# Patient Record
Sex: FEMALE | Race: WHITE | NOT HISPANIC OR LATINO | Employment: OTHER | URBAN - METROPOLITAN AREA
[De-identification: names, ages, dates, MRNs, and addresses within clinical notes are randomized per-mention and may not be internally consistent; named-entity substitution may affect disease eponyms.]

---

## 2017-07-21 ENCOUNTER — APPOINTMENT (OUTPATIENT)
Dept: WOMENS IMAGING | Facility: HOSPITAL | Age: 57
End: 2017-07-21

## 2017-07-21 PROCEDURE — 77067 SCR MAMMO BI INCL CAD: CPT | Performed by: RADIOLOGY

## 2017-07-21 PROCEDURE — 77063 BREAST TOMOSYNTHESIS BI: CPT | Performed by: RADIOLOGY

## 2017-07-21 PROCEDURE — G0202 SCR MAMMO BI INCL CAD: HCPCS | Performed by: RADIOLOGY

## 2017-10-12 ENCOUNTER — OFFICE VISIT (OUTPATIENT)
Dept: FAMILY MEDICINE CLINIC | Facility: CLINIC | Age: 57
End: 2017-10-12

## 2017-10-12 VITALS
WEIGHT: 150 LBS | DIASTOLIC BLOOD PRESSURE: 82 MMHG | RESPIRATION RATE: 16 BRPM | BODY MASS INDEX: 24.99 KG/M2 | HEIGHT: 65 IN | TEMPERATURE: 97.9 F | HEART RATE: 72 BPM | SYSTOLIC BLOOD PRESSURE: 126 MMHG

## 2017-10-12 DIAGNOSIS — Z00.00 ANNUAL PHYSICAL EXAM: ICD-10-CM

## 2017-10-12 DIAGNOSIS — K91.5 POST-CHOLECYSTECTOMY SYNDROME: Primary | ICD-10-CM

## 2017-10-12 DIAGNOSIS — R10.12 LUQ PAIN: ICD-10-CM

## 2017-10-12 PROCEDURE — 99203 OFFICE O/P NEW LOW 30 MIN: CPT | Performed by: FAMILY MEDICINE

## 2017-10-12 RX ORDER — COLESEVELAM 180 1/1
1875 TABLET ORAL 2 TIMES DAILY WITH MEALS
Qty: 180 TABLET | Refills: 11 | Status: SHIPPED | OUTPATIENT
Start: 2017-10-12 | End: 2018-12-05

## 2017-10-12 NOTE — PROGRESS NOTES
"Subjective   Cailin Garcia is a 57 y.o. female.     CC: Establishment of Care for Rib Cage Pain    History of Present Illness     Pt presents today to establish care for rib cage pain x 1 year. She actually points to the LUQ and reports really hurts when lays on that side, can be severe. Has had renal stones on that side and had treatment for that (Dr. Black). Always a \"little pressure\" in the area yet really hurts when laying on the left side and can even make her dyspnic when laying that way. Since she has had her GB out 2002, she has had diarrhea every day (c-scope UTD and WNL). No trauma to sit ever.      The following portions of the patient's history were reviewed and updated as appropriate: allergies, current medications, past family history, past medical history, past social history, past surgical history and problem list.    Review of Systems   Constitutional: Negative for activity change, chills, fatigue and fever.   Respiratory: Negative for cough and chest tightness.         Chest wall pain   Cardiovascular: Negative for chest pain and palpitations.   Gastrointestinal: Negative for abdominal pain and nausea.   Endocrine: Negative for cold intolerance.   Psychiatric/Behavioral: Negative for behavioral problems and dysphoric mood.     /82  Pulse 72  Temp 97.9 °F (36.6 °C) (Oral)   Resp 16  Ht 65\" (165.1 cm)  Wt 150 lb (68 kg)  BMI 24.96 kg/m2    Objective   Physical Exam   Constitutional: She appears well-developed and well-nourished.   Neck: Neck supple. No thyromegaly present.   Cardiovascular: Normal rate and regular rhythm.    No murmur heard.  Pulmonary/Chest: Effort normal and breath sounds normal.   Abdominal: Bowel sounds are normal. There is tenderness (LUQ w/o R/G/Mass).   Musculoskeletal: She exhibits tenderness (mild, left lateral lower ribs).   Psychiatric: She has a normal mood and affect. Her behavior is normal.   Nursing note and vitals reviewed.  Sury Kellogg np student, " present for exam.    Assessment/Plan   Cailin was seen today for left rib cage pain.    Diagnoses and all orders for this visit:    Post-cholecystectomy syndrome  -     colesevelam (WELCHOL) 625 MG tablet; Take 3 tablets by mouth 2 (Two) Times a Day With Meals.    LUQ pain  -     CT abdomen w wo contrast; Future    Annual physical exam  Comments:  for labs only  Orders:  -     Comprehensive metabolic panel  -     Lipid panel  -     CBC and Differential  -     TSH

## 2017-10-14 LAB
ALBUMIN SERPL-MCNC: 4.5 G/DL (ref 3.5–5.2)
ALBUMIN/GLOB SERPL: 1.7 G/DL
ALP SERPL-CCNC: 101 U/L (ref 39–117)
ALT SERPL-CCNC: 24 U/L (ref 1–33)
AST SERPL-CCNC: 17 U/L (ref 1–32)
BASOPHILS # BLD AUTO: 0.02 10*3/MM3 (ref 0–0.2)
BASOPHILS NFR BLD AUTO: 0.4 % (ref 0–1.5)
BILIRUB SERPL-MCNC: 0.3 MG/DL (ref 0.1–1.2)
BUN SERPL-MCNC: 8 MG/DL (ref 6–20)
BUN/CREAT SERPL: 8.8 (ref 7–25)
CALCIUM SERPL-MCNC: 9.1 MG/DL (ref 8.6–10.5)
CHLORIDE SERPL-SCNC: 103 MMOL/L (ref 98–107)
CHOLEST SERPL-MCNC: 207 MG/DL (ref 0–200)
CO2 SERPL-SCNC: 24.8 MMOL/L (ref 22–29)
CREAT SERPL-MCNC: 0.91 MG/DL (ref 0.57–1)
DIFFERENTIAL COMMENT: NORMAL
EOSINOPHIL # BLD AUTO: 0.27 10*3/MM3 (ref 0–0.7)
EOSINOPHIL NFR BLD AUTO: 4.8 % (ref 0.3–6.2)
ERYTHROCYTE [DISTWIDTH] IN BLOOD BY AUTOMATED COUNT: 13.2 % (ref 11.7–13)
GLOBULIN SER CALC-MCNC: 2.6 GM/DL
GLUCOSE SERPL-MCNC: 98 MG/DL (ref 65–99)
HCT VFR BLD AUTO: 42 % (ref 35.6–45.5)
HDLC SERPL-MCNC: 59 MG/DL (ref 40–60)
HGB BLD-MCNC: 13.5 G/DL (ref 11.9–15.5)
IMM GRANULOCYTES # BLD: 0 10*3/MM3 (ref 0–0.03)
IMM GRANULOCYTES NFR BLD: 0 % (ref 0–0.5)
LDLC SERPL CALC-MCNC: 122 MG/DL (ref 0–100)
LYMPHOCYTES # BLD AUTO: 2.47 10*3/MM3 (ref 0.9–4.8)
LYMPHOCYTES NFR BLD AUTO: 44 % (ref 19.6–45.3)
MCH RBC QN AUTO: 31.2 PG (ref 26.9–32)
MCHC RBC AUTO-ENTMCNC: 32.1 G/DL (ref 32.4–36.3)
MCV RBC AUTO: 97 FL (ref 80.5–98.2)
MONOCYTES # BLD AUTO: 0.34 10*3/MM3 (ref 0.2–1.2)
MONOCYTES NFR BLD AUTO: 6.1 % (ref 5–12)
NEUTROPHILS # BLD AUTO: 2.51 10*3/MM3 (ref 1.9–8.1)
NEUTROPHILS NFR BLD AUTO: 44.7 % (ref 42.7–76)
NRBC BLD AUTO-RTO: 0 /100 WBC (ref 0–0)
PLATELET # BLD AUTO: 310 10*3/MM3 (ref 140–500)
PLATELET BLD QL SMEAR: NORMAL
POTASSIUM SERPL-SCNC: 5.1 MMOL/L (ref 3.5–5.2)
PROT SERPL-MCNC: 7.1 G/DL (ref 6–8.5)
RBC # BLD AUTO: 4.33 10*6/MM3 (ref 3.9–5.2)
RBC MORPH BLD: NORMAL
SODIUM SERPL-SCNC: 142 MMOL/L (ref 136–145)
TRIGL SERPL-MCNC: 128 MG/DL (ref 0–150)
TSH SERPL DL<=0.005 MIU/L-ACNC: 4.97 MIU/ML (ref 0.27–4.2)
VLDLC SERPL CALC-MCNC: 25.6 MG/DL (ref 5–40)
WBC # BLD AUTO: 5.61 10*3/MM3 (ref 4.5–10.7)

## 2017-10-16 DIAGNOSIS — E03.9 HYPOTHYROIDISM, UNSPECIFIED TYPE: Primary | ICD-10-CM

## 2017-10-16 RX ORDER — LEVOTHYROXINE SODIUM 0.03 MG/1
25 TABLET ORAL DAILY
Qty: 30 TABLET | Refills: 5 | Status: SHIPPED | OUTPATIENT
Start: 2017-10-16 | End: 2018-06-28 | Stop reason: SDUPTHER

## 2017-10-23 ENCOUNTER — TELEPHONE (OUTPATIENT)
Dept: FAMILY MEDICINE CLINIC | Facility: CLINIC | Age: 57
End: 2017-10-23

## 2017-10-23 ENCOUNTER — HOSPITAL ENCOUNTER (OUTPATIENT)
Dept: CT IMAGING | Facility: HOSPITAL | Age: 57
Discharge: HOME | End: 2017-10-23
Admitting: FAMILY MEDICINE

## 2017-10-23 DIAGNOSIS — R10.12 LEFT UPPER QUADRANT PAIN: Primary | ICD-10-CM

## 2017-10-23 DIAGNOSIS — R10.12 LEFT UPPER QUADRANT PAIN: ICD-10-CM

## 2017-10-23 LAB — CREAT BLDA-MCNC: 0.8 MG/DL (ref 0.6–1.3)

## 2017-10-23 PROCEDURE — 74160 CT ABDOMEN W/CONTRAST: CPT

## 2017-10-23 PROCEDURE — 0 IOPAMIDOL 61 % SOLUTION: Performed by: FAMILY MEDICINE

## 2017-10-23 PROCEDURE — 82565 ASSAY OF CREATININE: CPT

## 2017-10-23 PROCEDURE — 0 DIATRIZOATE MEGLUMINE & SODIUM PER 1 ML: Performed by: FAMILY MEDICINE

## 2017-10-23 RX ADMIN — IOPAMIDOL 85 ML: 612 INJECTION, SOLUTION INTRAVENOUS at 10:38

## 2017-10-23 RX ADMIN — DIATRIZOATE MEGLUMINE AND DIATRIZOATE SODIUM 30 ML: 660; 100 LIQUID ORAL; RECTAL at 10:38

## 2017-12-06 LAB
T4 FREE SERPL-MCNC: 1.11 NG/DL (ref 0.93–1.7)
TSH SERPL DL<=0.005 MIU/L-ACNC: 2.84 MIU/ML (ref 0.27–4.2)

## 2018-06-28 ENCOUNTER — OFFICE VISIT (OUTPATIENT)
Dept: FAMILY MEDICINE CLINIC | Facility: CLINIC | Age: 58
End: 2018-06-28

## 2018-06-28 VITALS
TEMPERATURE: 97.8 F | DIASTOLIC BLOOD PRESSURE: 73 MMHG | HEART RATE: 68 BPM | RESPIRATION RATE: 16 BRPM | WEIGHT: 149 LBS | SYSTOLIC BLOOD PRESSURE: 121 MMHG | HEIGHT: 65 IN | BODY MASS INDEX: 24.83 KG/M2

## 2018-06-28 DIAGNOSIS — J01.00 ACUTE NON-RECURRENT MAXILLARY SINUSITIS: Primary | ICD-10-CM

## 2018-06-28 DIAGNOSIS — E03.9 HYPOTHYROIDISM, UNSPECIFIED TYPE: ICD-10-CM

## 2018-06-28 PROCEDURE — 99213 OFFICE O/P EST LOW 20 MIN: CPT | Performed by: FAMILY MEDICINE

## 2018-06-28 RX ORDER — LEVOTHYROXINE SODIUM 0.03 MG/1
25 TABLET ORAL DAILY
Qty: 30 TABLET | Refills: 5 | Status: SHIPPED | OUTPATIENT
Start: 2018-06-28 | End: 2018-12-05 | Stop reason: SDUPTHER

## 2018-06-28 RX ORDER — AZITHROMYCIN 250 MG/1
TABLET, FILM COATED ORAL
Qty: 6 TABLET | Refills: 0 | Status: SHIPPED | OUTPATIENT
Start: 2018-06-28 | End: 2018-12-05

## 2018-06-28 NOTE — PROGRESS NOTES
"Subjective   Cailin Garcia is a 57 y.o. female.     History of Present Illness     Chief Complaint:   Chief Complaint   Patient presents with   • Nasal Congestion     x 4-5 days    • Sinusitis   • Hypothyroidism     out of medications since march        Cailin Garcia 57 y.o. female who presents today for Medical Management of the below listed issues and medication refills.  she has a problem list of   Patient Active Problem List   Diagnosis   • Post-cholecystectomy syndrome   .  Since the last visit, she has overall felt well until 5 days ago when she developed LAD (neck), ear pressure, mild cough and \"chest tightness\". No f/c. Did have some colored sputum. she has been compliant with   Current Outpatient Prescriptions:   •  azithromycin (ZITHROMAX Z-KOTA) 250 MG tablet, Take 2 tablets the first day, then 1 tablet daily for 4 days., Disp: 6 tablet, Rfl: 0  •  colesevelam (WELCHOL) 625 MG tablet, Take 3 tablets by mouth 2 (Two) Times a Day With Meals., Disp: 180 tablet, Rfl: 11  •  levothyroxine (SYNTHROID) 25 MCG tablet, Take 1 tablet by mouth Daily., Disp: 30 tablet, Rfl: 5.  she denies medication side effects.    All of the chronic condition(s) listed above are stable w/o issues.    /73   Pulse 68   Temp 97.8 °F (36.6 °C) (Oral)   Resp 16   Ht 165.1 cm (65\")   Wt 67.6 kg (149 lb)   BMI 24.79 kg/m²     Results for orders placed or performed during the hospital encounter of 10/23/17   POC Creatinine   Result Value Ref Range    Creatinine 0.80 0.60 - 1.30 mg/dL           The following portions of the patient's history were reviewed and updated as appropriate: allergies, current medications, past family history, past medical history, past social history, past surgical history and problem list.    Review of Systems   Constitutional: Negative for activity change, chills, fatigue and fever.   HENT: Positive for congestion and sinus pressure.    Respiratory: Positive for cough. Negative for chest tightness.  "   Cardiovascular: Negative for chest pain and palpitations.   Gastrointestinal: Negative for abdominal pain and nausea.   Endocrine: Negative for cold intolerance.   Psychiatric/Behavioral: Negative for behavioral problems and dysphoric mood.       Objective   Physical Exam   Constitutional: She appears well-developed and well-nourished.   HENT:   Nose: Right sinus exhibits maxillary sinus tenderness. Left sinus exhibits maxillary sinus tenderness.   Neck: Neck supple. No thyromegaly present.   Cardiovascular: Normal rate and regular rhythm.    No murmur heard.  Pulmonary/Chest: Effort normal and breath sounds normal.   Lymphadenopathy:     She has no cervical adenopathy.   Psychiatric: She has a normal mood and affect. Her behavior is normal.   Nursing note and vitals reviewed.      Assessment/Plan   Cailin was seen today for nasal congestion, sinusitis and hypothyroidism.    Diagnoses and all orders for this visit:    Acute non-recurrent maxillary sinusitis  -     azithromycin (ZITHROMAX Z-KOTA) 250 MG tablet; Take 2 tablets the first day, then 1 tablet daily for 4 days.    Hypothyroidism, unspecified type  -     levothyroxine (SYNTHROID) 25 MCG tablet; Take 1 tablet by mouth Daily.

## 2018-08-09 ENCOUNTER — APPOINTMENT (OUTPATIENT)
Dept: WOMENS IMAGING | Facility: HOSPITAL | Age: 58
End: 2018-08-09

## 2018-08-09 PROCEDURE — 77063 BREAST TOMOSYNTHESIS BI: CPT | Performed by: RADIOLOGY

## 2018-08-09 PROCEDURE — 77067 SCR MAMMO BI INCL CAD: CPT | Performed by: RADIOLOGY

## 2018-11-28 ENCOUNTER — TELEPHONE (OUTPATIENT)
Dept: FAMILY MEDICINE CLINIC | Facility: CLINIC | Age: 58
End: 2018-11-28

## 2018-11-28 DIAGNOSIS — Z00.00 GENERAL MEDICAL EXAM: ICD-10-CM

## 2018-11-28 DIAGNOSIS — E03.9 ACQUIRED HYPOTHYROIDISM: Primary | ICD-10-CM

## 2018-12-02 LAB
ALBUMIN SERPL-MCNC: 4.6 G/DL (ref 3.5–5.5)
ALBUMIN/GLOB SERPL: 2.1 {RATIO} (ref 1.2–2.2)
ALP SERPL-CCNC: 112 IU/L (ref 39–117)
ALT SERPL-CCNC: 32 IU/L (ref 0–32)
AST SERPL-CCNC: 21 IU/L (ref 0–40)
BASOPHILS # BLD AUTO: 0 X10E3/UL (ref 0–0.2)
BASOPHILS NFR BLD AUTO: 1 %
BILIRUB SERPL-MCNC: 0.3 MG/DL (ref 0–1.2)
BUN SERPL-MCNC: 9 MG/DL (ref 6–24)
BUN/CREAT SERPL: 11 (ref 9–23)
CALCIUM SERPL-MCNC: 9.5 MG/DL (ref 8.7–10.2)
CHLORIDE SERPL-SCNC: 104 MMOL/L (ref 96–106)
CHOLEST SERPL-MCNC: 223 MG/DL (ref 100–199)
CO2 SERPL-SCNC: 23 MMOL/L (ref 20–29)
CREAT SERPL-MCNC: 0.79 MG/DL (ref 0.57–1)
EOSINOPHIL # BLD AUTO: 0.3 X10E3/UL (ref 0–0.4)
EOSINOPHIL NFR BLD AUTO: 5 %
ERYTHROCYTE [DISTWIDTH] IN BLOOD BY AUTOMATED COUNT: 13.9 % (ref 12.3–15.4)
GLOBULIN SER CALC-MCNC: 2.2 G/DL (ref 1.5–4.5)
GLUCOSE SERPL-MCNC: 101 MG/DL (ref 65–99)
HCT VFR BLD AUTO: 43.3 % (ref 34–46.6)
HDLC SERPL-MCNC: 56 MG/DL
HGB BLD-MCNC: 14.3 G/DL (ref 11.1–15.9)
IMM GRANULOCYTES # BLD: 0 X10E3/UL (ref 0–0.1)
IMM GRANULOCYTES NFR BLD: 0 %
LDLC SERPL CALC-MCNC: 130 MG/DL (ref 0–99)
LDLC/HDLC SERPL: 2.3 RATIO (ref 0–3.2)
LYMPHOCYTES # BLD AUTO: 2.7 X10E3/UL (ref 0.7–3.1)
LYMPHOCYTES NFR BLD AUTO: 45 %
MCH RBC QN AUTO: 30.2 PG (ref 26.6–33)
MCHC RBC AUTO-ENTMCNC: 33 G/DL (ref 31.5–35.7)
MCV RBC AUTO: 92 FL (ref 79–97)
MONOCYTES # BLD AUTO: 0.4 X10E3/UL (ref 0.1–0.9)
MONOCYTES NFR BLD AUTO: 7 %
NEUTROPHILS # BLD AUTO: 2.5 X10E3/UL (ref 1.4–7)
NEUTROPHILS NFR BLD AUTO: 42 %
PLATELET # BLD AUTO: 287 X10E3/UL (ref 150–379)
POTASSIUM SERPL-SCNC: 4 MMOL/L (ref 3.5–5.2)
PROT SERPL-MCNC: 6.8 G/DL (ref 6–8.5)
RBC # BLD AUTO: 4.73 X10E6/UL (ref 3.77–5.28)
SODIUM SERPL-SCNC: 143 MMOL/L (ref 134–144)
T4 FREE SERPL-MCNC: 1.24 NG/DL (ref 0.82–1.77)
TRIGL SERPL-MCNC: 185 MG/DL (ref 0–149)
TSH SERPL DL<=0.005 MIU/L-ACNC: 4.53 UIU/ML (ref 0.45–4.5)
VLDLC SERPL CALC-MCNC: 37 MG/DL (ref 5–40)
WBC # BLD AUTO: 6 X10E3/UL (ref 3.4–10.8)

## 2018-12-05 ENCOUNTER — OFFICE VISIT (OUTPATIENT)
Dept: FAMILY MEDICINE CLINIC | Facility: CLINIC | Age: 58
End: 2018-12-05

## 2018-12-05 VITALS
TEMPERATURE: 98.2 F | RESPIRATION RATE: 16 BRPM | WEIGHT: 150 LBS | HEART RATE: 72 BPM | SYSTOLIC BLOOD PRESSURE: 119 MMHG | HEIGHT: 65 IN | BODY MASS INDEX: 24.99 KG/M2 | DIASTOLIC BLOOD PRESSURE: 74 MMHG

## 2018-12-05 DIAGNOSIS — E03.9 HYPOTHYROIDISM, UNSPECIFIED TYPE: ICD-10-CM

## 2018-12-05 PROCEDURE — 99213 OFFICE O/P EST LOW 20 MIN: CPT | Performed by: FAMILY MEDICINE

## 2018-12-05 RX ORDER — LEVOTHYROXINE SODIUM 0.03 MG/1
25 TABLET ORAL DAILY
Qty: 30 TABLET | Refills: 11 | Status: SHIPPED | OUTPATIENT
Start: 2018-12-05 | End: 2019-04-24 | Stop reason: SDUPTHER

## 2018-12-05 NOTE — PROGRESS NOTES
"Subjective   Cailin Garcia is a 58 y.o. female.     History of Present Illness     Chief Complaint:   Chief Complaint   Patient presents with   • Hypothyroidism     MED REFILL LAB RESULTS        Cailin Garcia 58 y.o. female who presents today for Medical Management of the below listed issues and medication refills.  she has a problem list of   Patient Active Problem List   Diagnosis   • Post-cholecystectomy syndrome   • Hypothyroidism   .  Since the last visit, she has overall felt well.  she has been compliant with   Current Outpatient Medications:   •  levothyroxine (SYNTHROID) 25 MCG tablet, Take 1 tablet by mouth Daily., Disp: 30 tablet, Rfl: 11.  she denies medication side effects.    All of the chronic condition(s) listed above are stable w/o issues.    /74   Pulse 72   Temp 98.2 °F (36.8 °C) (Oral)   Resp 16   Ht 165.1 cm (65\")   Wt 68 kg (150 lb)   BMI 24.96 kg/m²     Results for orders placed or performed in visit on 11/28/18   Comprehensive metabolic panel   Result Value Ref Range    Glucose 101 (H) 65 - 99 mg/dL    BUN 9 6 - 24 mg/dL    Creatinine 0.79 0.57 - 1.00 mg/dL    eGFR Non African Am 82 >59 mL/min/1.73    eGFR African Am 95 >59 mL/min/1.73    BUN/Creatinine Ratio 11 9 - 23    Sodium 143 134 - 144 mmol/L    Potassium 4.0 3.5 - 5.2 mmol/L    Chloride 104 96 - 106 mmol/L    Total CO2 23 20 - 29 mmol/L    Calcium 9.5 8.7 - 10.2 mg/dL    Total Protein 6.8 6.0 - 8.5 g/dL    Albumin 4.6 3.5 - 5.5 g/dL    Globulin 2.2 1.5 - 4.5 g/dL    A/G Ratio 2.1 1.2 - 2.2    Total Bilirubin 0.3 0.0 - 1.2 mg/dL    Alkaline Phosphatase 112 39 - 117 IU/L    AST (SGOT) 21 0 - 40 IU/L    ALT (SGPT) 32 0 - 32 IU/L   Lipid Panel With LDL/HDL Ratio   Result Value Ref Range    Total Cholesterol 223 (H) 100 - 199 mg/dL    Triglycerides 185 (H) 0 - 149 mg/dL    HDL Cholesterol 56 >39 mg/dL    VLDL Cholesterol 37 5 - 40 mg/dL    LDL Cholesterol  130 (H) 0 - 99 mg/dL    LDL/HDL Ratio 2.3 0.0 - 3.2 ratio   TSH "   Result Value Ref Range    TSH 4.530 (H) 0.450 - 4.500 uIU/mL   T4, Free   Result Value Ref Range    Free T4 1.24 0.82 - 1.77 ng/dL   CBC and Differential   Result Value Ref Range    WBC 6.0 3.4 - 10.8 x10E3/uL    RBC 4.73 3.77 - 5.28 x10E6/uL    Hemoglobin 14.3 11.1 - 15.9 g/dL    Hematocrit 43.3 34.0 - 46.6 %    MCV 92 79 - 97 fL    MCH 30.2 26.6 - 33.0 pg    MCHC 33.0 31.5 - 35.7 g/dL    RDW 13.9 12.3 - 15.4 %    Platelets 287 150 - 379 x10E3/uL    Neutrophil Rel % 42 Not Estab. %    Lymphocyte Rel % 45 Not Estab. %    Monocyte Rel % 7 Not Estab. %    Eosinophil Rel % 5 Not Estab. %    Basophil Rel % 1 Not Estab. %    Neutrophils Absolute 2.5 1.4 - 7.0 x10E3/uL    Lymphocytes Absolute 2.7 0.7 - 3.1 x10E3/uL    Monocytes Absolute 0.4 0.1 - 0.9 x10E3/uL    Eosinophils Absolute 0.3 0.0 - 0.4 x10E3/uL    Basophils Absolute 0.0 0.0 - 0.2 x10E3/uL    Immature Granulocyte Rel % 0 Not Estab. %    Immature Grans Absolute 0.0 0.0 - 0.1 x10E3/uL           The following portions of the patient's history were reviewed and updated as appropriate: allergies, current medications, past family history, past medical history, past social history, past surgical history and problem list.    Review of Systems   Constitutional: Negative for activity change, chills, fatigue and fever.   Respiratory: Negative for cough and chest tightness.    Cardiovascular: Negative for chest pain and palpitations.   Gastrointestinal: Negative for abdominal pain and nausea.   Endocrine: Negative for cold intolerance.   Psychiatric/Behavioral: Negative for behavioral problems and dysphoric mood.       Objective   Physical Exam   Constitutional: She appears well-developed and well-nourished.   Neck: Neck supple. No thyromegaly present.   Cardiovascular: Normal rate and regular rhythm.   No murmur heard.  Pulmonary/Chest: Effort normal and breath sounds normal.   Abdominal: Bowel sounds are normal. There is no tenderness.   Neurological: She is alert.    Psychiatric: She has a normal mood and affect. Her behavior is normal.   Nursing note and vitals reviewed.  Labs reviewed with pt today during visit. All questions answered.      Assessment/Plan   Cailin was seen today for hypothyroidism.    Diagnoses and all orders for this visit:    Hypothyroidism, unspecified type  -     levothyroxine (SYNTHROID) 25 MCG tablet; Take 1 tablet by mouth Daily.  -     TSH; Future  -     T4, Free; Future

## 2019-03-05 ENCOUNTER — RESULTS ENCOUNTER (OUTPATIENT)
Dept: FAMILY MEDICINE CLINIC | Facility: CLINIC | Age: 59
End: 2019-03-05

## 2019-03-05 DIAGNOSIS — E03.9 HYPOTHYROIDISM, UNSPECIFIED TYPE: ICD-10-CM

## 2019-04-20 LAB
T4 FREE SERPL-MCNC: 1.15 NG/DL (ref 0.93–1.7)
TSH SERPL DL<=0.005 MIU/L-ACNC: 2.68 MIU/ML (ref 0.27–4.2)

## 2019-04-24 ENCOUNTER — OFFICE VISIT (OUTPATIENT)
Dept: FAMILY MEDICINE CLINIC | Facility: CLINIC | Age: 59
End: 2019-04-24

## 2019-04-24 VITALS
SYSTOLIC BLOOD PRESSURE: 109 MMHG | WEIGHT: 149 LBS | RESPIRATION RATE: 14 BRPM | BODY MASS INDEX: 24.83 KG/M2 | HEART RATE: 62 BPM | DIASTOLIC BLOOD PRESSURE: 62 MMHG | HEIGHT: 65 IN | TEMPERATURE: 98.1 F

## 2019-04-24 DIAGNOSIS — E03.9 ACQUIRED HYPOTHYROIDISM: ICD-10-CM

## 2019-04-24 PROCEDURE — 99213 OFFICE O/P EST LOW 20 MIN: CPT | Performed by: FAMILY MEDICINE

## 2019-04-24 RX ORDER — LEVOTHYROXINE SODIUM 0.03 MG/1
25 TABLET ORAL DAILY
Qty: 90 TABLET | Refills: 3 | Status: SHIPPED | OUTPATIENT
Start: 2019-04-24 | End: 2020-01-31 | Stop reason: SDUPTHER

## 2019-09-03 ENCOUNTER — APPOINTMENT (OUTPATIENT)
Dept: WOMENS IMAGING | Facility: HOSPITAL | Age: 59
End: 2019-09-03

## 2019-09-03 PROCEDURE — 77080 DXA BONE DENSITY AXIAL: CPT | Performed by: RADIOLOGY

## 2019-09-03 PROCEDURE — 77067 SCR MAMMO BI INCL CAD: CPT | Performed by: RADIOLOGY

## 2019-09-03 PROCEDURE — 77063 BREAST TOMOSYNTHESIS BI: CPT | Performed by: RADIOLOGY

## 2020-01-23 ENCOUNTER — TELEPHONE (OUTPATIENT)
Dept: FAMILY MEDICINE CLINIC | Facility: CLINIC | Age: 60
End: 2020-01-23

## 2020-01-23 DIAGNOSIS — Z00.00 GENERAL MEDICAL EXAM: ICD-10-CM

## 2020-01-23 DIAGNOSIS — E03.9 ACQUIRED HYPOTHYROIDISM: Primary | ICD-10-CM

## 2020-01-23 NOTE — TELEPHONE ENCOUNTER
Pt want labs done for her Thyroid due to hair loss. She will call me next week so that we can recheck her Thyroid while she is in [place]. She states she is having issues with her hair thinning and falling out

## 2020-01-31 DIAGNOSIS — E03.9 ACQUIRED HYPOTHYROIDISM: ICD-10-CM

## 2020-01-31 RX ORDER — LEVOTHYROXINE SODIUM 0.03 MG/1
25 TABLET ORAL DAILY
Qty: 90 TABLET | Refills: 0 | Status: SHIPPED | OUTPATIENT
Start: 2020-01-31 | End: 2020-04-28 | Stop reason: SDUPTHER

## 2020-04-28 ENCOUNTER — TELEMEDICINE (OUTPATIENT)
Dept: FAMILY MEDICINE CLINIC | Facility: CLINIC | Age: 60
End: 2020-04-28

## 2020-04-28 DIAGNOSIS — E03.9 ACQUIRED HYPOTHYROIDISM: ICD-10-CM

## 2020-04-28 PROCEDURE — 99213 OFFICE O/P EST LOW 20 MIN: CPT | Performed by: FAMILY MEDICINE

## 2020-04-28 RX ORDER — LEVOTHYROXINE SODIUM 0.03 MG/1
25 TABLET ORAL DAILY
Qty: 90 TABLET | Refills: 3 | Status: SHIPPED | OUTPATIENT
Start: 2020-04-28 | End: 2021-04-08 | Stop reason: SDUPTHER

## 2020-04-28 NOTE — PROGRESS NOTES
Subjective   Cailin Garcia is a 59 y.o. female.     CC: Video Visit for Medical Management    History of Present Illness     Chief Complaint:   Chief Complaint   Patient presents with   • Hypothyroidism       Cailin Garcia 59 y.o. female who presents today for Medical Management of the below listed issues and medication refills.  she has a problem list of   Patient Active Problem List   Diagnosis   • Post-cholecystectomy syndrome   • Hypothyroidism   .  Since the last visit, she has overall felt well.  she has been compliant with   Current Outpatient Medications:   •  levothyroxine (Synthroid) 25 MCG tablet, Take 1 tablet by mouth Daily., Disp: 90 tablet, Rfl: 3.  she denies medication side effects.    All of the other chronic condition(s) listed above are stable w/o issues.    There were no vitals taken for this visit.    Results for orders placed or performed in visit on 03/05/19   TSH   Result Value Ref Range    TSH 2.680 0.270 - 4.200 mIU/mL   T4, Free   Result Value Ref Range    Free T4 1.15 0.93 - 1.70 ng/dL           The following portions of the patient's history were reviewed and updated as appropriate: allergies, current medications, past family history, past medical history, past social history, past surgical history and problem list.    Review of Systems   Constitutional: Negative for activity change, chills and fever.   Respiratory: Negative for cough.    Cardiovascular: Negative for chest pain.   Psychiatric/Behavioral: Negative for dysphoric mood.       Objective   Physical Exam   Constitutional: She appears well-developed and well-nourished. No distress.   Psychiatric: She has a normal mood and affect. Her behavior is normal. Thought content normal.   Labs reviewed with pt today during visit. All questions answered.      Assessment/Plan   Cailin was seen today for hypothyroidism.    Diagnoses and all orders for this visit:    Acquired hypothyroidism  -     levothyroxine (Synthroid) 25 MCG tablet;  Take 1 tablet by mouth Daily.    Spent  15   minutes with chart and interview and consent for this encounter given by the patient.  You have chosen to receive care through a telehealth visit.  Do you consent to use a video/audio connection for your medical care today? Yes

## 2020-09-23 ENCOUNTER — APPOINTMENT (OUTPATIENT)
Dept: WOMENS IMAGING | Facility: HOSPITAL | Age: 60
End: 2020-09-23

## 2020-09-23 PROCEDURE — 77067 SCR MAMMO BI INCL CAD: CPT | Performed by: RADIOLOGY

## 2020-09-23 PROCEDURE — 77063 BREAST TOMOSYNTHESIS BI: CPT | Performed by: RADIOLOGY

## 2021-03-16 ENCOUNTER — BULK ORDERING (OUTPATIENT)
Dept: CASE MANAGEMENT | Facility: OTHER | Age: 61
End: 2021-03-16

## 2021-03-16 DIAGNOSIS — Z23 IMMUNIZATION DUE: ICD-10-CM

## 2021-04-08 ENCOUNTER — TELEPHONE (OUTPATIENT)
Dept: FAMILY MEDICINE CLINIC | Facility: CLINIC | Age: 61
End: 2021-04-08

## 2021-04-08 DIAGNOSIS — Z00.00 GENERAL MEDICAL EXAM: Primary | ICD-10-CM

## 2021-04-08 DIAGNOSIS — E03.9 ACQUIRED HYPOTHYROIDISM: ICD-10-CM

## 2021-04-08 RX ORDER — LEVOTHYROXINE SODIUM 0.03 MG/1
25 TABLET ORAL DAILY
Qty: 90 TABLET | Refills: 0 | Status: SHIPPED | OUTPATIENT
Start: 2021-04-08 | End: 2021-04-15 | Stop reason: SDUPTHER

## 2021-04-08 NOTE — TELEPHONE ENCOUNTER
PATIENT STATES THAT HER THYROID MEDICATION IS GOING TO  SOON AND IS REQUESTING AN ORDER FOR LABS PRIOR TO HER VISIT    SHE ALSO STATES THAT SHE SPENDS HER FRANCO IN [place] AND STATES THAT APPROXIMATELY 3-4 WEEKS AFTER BEING THERE SHE EXPERIENCES HAIR LOSS. SHE STATES THAT THIS HAPPENED THE PREVIOUS WINTER AS WELL. SHE STATES THAT SHE IS EXPOSED TO POOL/LAWN/PESTICIDE CHEMICALS AND WOULD LIKE TO BE ADVISED ON IF THERE MAY BE A CONNECTION BETWEEN THE HAIR LOSS AND ALL THE CHEMICALS THAT SHE IS EXPOSED TO WHILE IN [place] OR IF THERE IS A LAB THAT CAN IDENTIFY THE CAUSE    PLEASE ADVISE    PATIENT CAN BE REACHED AT  [phone]

## 2021-04-12 LAB
ALBUMIN SERPL-MCNC: 4.6 G/DL (ref 3.5–5.2)
ALBUMIN/GLOB SERPL: 2.3 G/DL
ALP SERPL-CCNC: 101 U/L (ref 39–117)
ALT SERPL-CCNC: 72 U/L (ref 1–33)
AST SERPL-CCNC: 58 U/L (ref 1–32)
BASOPHILS # BLD AUTO: 0.04 10*3/MM3 (ref 0–0.2)
BASOPHILS NFR BLD AUTO: 1 % (ref 0–1.5)
BILIRUB SERPL-MCNC: 0.4 MG/DL (ref 0–1.2)
BUN SERPL-MCNC: 11 MG/DL (ref 8–23)
BUN/CREAT SERPL: 14.3 (ref 7–25)
CALCIUM SERPL-MCNC: 9.6 MG/DL (ref 8.6–10.5)
CHLORIDE SERPL-SCNC: 105 MMOL/L (ref 98–107)
CHOLEST SERPL-MCNC: 166 MG/DL (ref 0–200)
CO2 SERPL-SCNC: 26.9 MMOL/L (ref 22–29)
CREAT SERPL-MCNC: 0.77 MG/DL (ref 0.57–1)
EOSINOPHIL # BLD AUTO: 0.24 10*3/MM3 (ref 0–0.4)
EOSINOPHIL NFR BLD AUTO: 6.1 % (ref 0.3–6.2)
ERYTHROCYTE [DISTWIDTH] IN BLOOD BY AUTOMATED COUNT: 12.6 % (ref 12.3–15.4)
GLOBULIN SER CALC-MCNC: 2 GM/DL
GLUCOSE SERPL-MCNC: 94 MG/DL (ref 65–99)
HCT VFR BLD AUTO: 40.7 % (ref 34–46.6)
HDLC SERPL-MCNC: 65 MG/DL (ref 40–60)
HGB BLD-MCNC: 14.3 G/DL (ref 12–15.9)
IMM GRANULOCYTES # BLD AUTO: 0.01 10*3/MM3 (ref 0–0.05)
IMM GRANULOCYTES NFR BLD AUTO: 0.3 % (ref 0–0.5)
LDLC SERPL CALC-MCNC: 84 MG/DL (ref 0–100)
LYMPHOCYTES # BLD AUTO: 1.48 10*3/MM3 (ref 0.7–3.1)
LYMPHOCYTES NFR BLD AUTO: 37.7 % (ref 19.6–45.3)
MCH RBC QN AUTO: 32 PG (ref 26.6–33)
MCHC RBC AUTO-ENTMCNC: 35.1 G/DL (ref 31.5–35.7)
MCV RBC AUTO: 91.1 FL (ref 79–97)
MONOCYTES # BLD AUTO: 0.49 10*3/MM3 (ref 0.1–0.9)
MONOCYTES NFR BLD AUTO: 12.5 % (ref 5–12)
NEUTROPHILS # BLD AUTO: 1.67 10*3/MM3 (ref 1.7–7)
NEUTROPHILS NFR BLD AUTO: 42.4 % (ref 42.7–76)
NRBC BLD AUTO-RTO: 0 /100 WBC (ref 0–0.2)
PLATELET # BLD AUTO: 259 10*3/MM3 (ref 140–450)
POTASSIUM SERPL-SCNC: 4.2 MMOL/L (ref 3.5–5.2)
PROT SERPL-MCNC: 6.6 G/DL (ref 6–8.5)
RBC # BLD AUTO: 4.47 10*6/MM3 (ref 3.77–5.28)
SODIUM SERPL-SCNC: 140 MMOL/L (ref 136–145)
T4 FREE SERPL-MCNC: 1.19 NG/DL (ref 0.93–1.7)
TRIGL SERPL-MCNC: 93 MG/DL (ref 0–150)
TSH SERPL DL<=0.005 MIU/L-ACNC: 2.39 UIU/ML (ref 0.27–4.2)
VLDLC SERPL CALC-MCNC: 17 MG/DL (ref 5–40)
WBC # BLD AUTO: 3.93 10*3/MM3 (ref 3.4–10.8)

## 2021-04-14 RX ORDER — METRONIDAZOLE 7.5 MG/G
GEL TOPICAL
COMMUNITY
Start: 2021-03-19 | End: 2022-12-13

## 2021-04-18 NOTE — PROGRESS NOTES
"Subjective   Cailin Garcia is a 60 y.o. female.     History of Present Illness     Chief Complaint:   Chief Complaint   Patient presents with   • Hypothyroidism     med refill  - lab results  [facility]        Cailin Garcia 60 y.o. female who presents today for Medical Management of the below listed issues and medication refills.  she has a problem list of   Patient Active Problem List   Diagnosis   • Post-cholecystectomy syndrome   • Hypothyroidism   • Hair loss   .  Since the last visit, she has overall felt well, although filled out this previsit survey prior to today:    Pt reports she had her COVID vaccine 2 days prior to this lab draw, possibly affecting the LFTs.    Pt has her mammograms through her GYN and is UTD.      she has been compliant with   Current Outpatient Medications:   •  levothyroxine (Synthroid) 25 MCG tablet, Take 1 tablet by mouth Daily., Disp: 90 tablet, Rfl: 3  •  metroNIDAZOLE (METROGEL) 0.75 % gel, APPLY TO THE FACE DAILY AFTER WASHING, Disp: , Rfl: .  she denies medication side effects.    All of the other chronic condition(s) listed above are stable w/o issues.    /82   Pulse 78   Temp 97.5 °F (36.4 °C) (Oral)   Resp 16   Ht 165.1 cm (65\")   Wt 65.3 kg (144 lb)   SpO2 98%   BMI 23.96 kg/m²     Results for orders placed or performed in visit on 04/08/21   Comprehensive metabolic panel    Specimen: Blood   Result Value Ref Range    Glucose 94 65 - 99 mg/dL    BUN 11 8 - 23 mg/dL    Creatinine 0.77 0.57 - 1.00 mg/dL    eGFR Non African Am 76 >60 mL/min/1.73    eGFR African Am 92 >60 mL/min/1.73    BUN/Creatinine Ratio 14.3 7.0 - 25.0    Sodium 140 136 - 145 mmol/L    Potassium 4.2 3.5 - 5.2 mmol/L    Chloride 105 98 - 107 mmol/L    Total CO2 26.9 22.0 - 29.0 mmol/L    Calcium 9.6 8.6 - 10.5 mg/dL    Total Protein 6.6 6.0 - 8.5 g/dL    Albumin 4.60 3.50 - 5.20 g/dL    Globulin 2.0 gm/dL    A/G Ratio 2.3 g/dL    Total Bilirubin 0.4 0.0 - 1.2 mg/dL    Alkaline Phosphatase " 101 39 - 117 U/L    AST (SGOT) 58 (H) 1 - 32 U/L    ALT (SGPT) 72 (H) 1 - 33 U/L   Lipid panel    Specimen: Blood   Result Value Ref Range    Total Cholesterol 166 0 - 200 mg/dL    Triglycerides 93 0 - 150 mg/dL    HDL Cholesterol 65 (H) 40 - 60 mg/dL    VLDL Cholesterol Raymond 17 5 - 40 mg/dL    LDL Chol Calc (NIH) 84 0 - 100 mg/dL   TSH    Specimen: Blood   Result Value Ref Range    TSH 2.390 0.270 - 4.200 uIU/mL   T4, Free    Specimen: Blood   Result Value Ref Range    Free T4 1.19 0.93 - 1.70 ng/dL   CBC and Differential    Specimen: Blood   Result Value Ref Range    WBC 3.93 3.40 - 10.80 10*3/mm3    RBC 4.47 3.77 - 5.28 10*6/mm3    Hemoglobin 14.3 12.0 - 15.9 g/dL    Hematocrit 40.7 34.0 - 46.6 %    MCV 91.1 79.0 - 97.0 fL    MCH 32.0 26.6 - 33.0 pg    MCHC 35.1 31.5 - 35.7 g/dL    RDW 12.6 12.3 - 15.4 %    Platelets 259 140 - 450 10*3/mm3    Neutrophil Rel % 42.4 (L) 42.7 - 76.0 %    Lymphocyte Rel % 37.7 19.6 - 45.3 %    Monocyte Rel % 12.5 (H) 5.0 - 12.0 %    Eosinophil Rel % 6.1 0.3 - 6.2 %    Basophil Rel % 1.0 0.0 - 1.5 %    Neutrophils Absolute 1.67 (L) 1.70 - 7.00 10*3/mm3    Lymphocytes Absolute 1.48 0.70 - 3.10 10*3/mm3    Monocytes Absolute 0.49 0.10 - 0.90 10*3/mm3    Eosinophils Absolute 0.24 0.00 - 0.40 10*3/mm3    Basophils Absolute 0.04 0.00 - 0.20 10*3/mm3    Immature Granulocyte Rel % 0.3 0.0 - 0.5 %    Immature Grans Absolute 0.01 0.00 - 0.05 10*3/mm3    nRBC 0.0 0.0 - 0.2 /100 WBC           The following portions of the patient's history were reviewed and updated as appropriate: allergies, current medications, past family history, past medical history, past social history, past surgical history and problem list.    Review of Systems   Constitutional: Negative for activity change, chills and fever.   Respiratory: Negative for cough.    Cardiovascular: Negative for chest pain.   Psychiatric/Behavioral: Negative for dysphoric mood.       Objective   Physical Exam  Constitutional:       General:  She is not in acute distress.     Appearance: She is well-developed.   Pulmonary:      Effort: Pulmonary effort is normal.   Neurological:      Mental Status: She is alert and oriented to person, place, and time.   Psychiatric:         Behavior: Behavior normal.         Thought Content: Thought content normal.     Labs reviewed with pt today during visit. All questions answered.      Assessment/Plan   Diagnoses and all orders for this visit:    1. Acquired hypothyroidism (Primary)  -     levothyroxine (Synthroid) 25 MCG tablet; Take 1 tablet by mouth Daily.  Dispense: 90 tablet; Refill: 3    2. Elevated LFTs  -     Hepatic Function Panel; Future    3. Screening for colon cancer  -     Ambulatory Referral to General Surgery    Other orders  -     Cancel: US Liver; Future

## 2021-04-19 ENCOUNTER — OFFICE VISIT (OUTPATIENT)
Dept: FAMILY MEDICINE CLINIC | Facility: CLINIC | Age: 61
End: 2021-04-19

## 2021-04-19 VITALS
BODY MASS INDEX: 23.99 KG/M2 | RESPIRATION RATE: 16 BRPM | DIASTOLIC BLOOD PRESSURE: 82 MMHG | HEIGHT: 65 IN | HEART RATE: 78 BPM | TEMPERATURE: 97.5 F | SYSTOLIC BLOOD PRESSURE: 118 MMHG | OXYGEN SATURATION: 98 % | WEIGHT: 144 LBS

## 2021-04-19 DIAGNOSIS — E03.9 ACQUIRED HYPOTHYROIDISM: Primary | Chronic | ICD-10-CM

## 2021-04-19 DIAGNOSIS — R79.89 ELEVATED LFTS: ICD-10-CM

## 2021-04-19 DIAGNOSIS — Z12.11 SCREENING FOR COLON CANCER: ICD-10-CM

## 2021-04-19 PROBLEM — L65.9 HAIR LOSS: Status: ACTIVE | Noted: 2021-04-19

## 2021-04-19 PROCEDURE — 99214 OFFICE O/P EST MOD 30 MIN: CPT | Performed by: FAMILY MEDICINE

## 2021-04-19 RX ORDER — LEVOTHYROXINE SODIUM 0.03 MG/1
25 TABLET ORAL DAILY
Qty: 90 TABLET | Refills: 3 | Status: SHIPPED | OUTPATIENT
Start: 2021-04-19 | End: 2022-05-10 | Stop reason: SDUPTHER

## 2021-04-29 ENCOUNTER — PREP FOR SURGERY (OUTPATIENT)
Dept: OTHER | Facility: HOSPITAL | Age: 61
End: 2021-04-29

## 2021-04-29 DIAGNOSIS — Z12.11 SCREEN FOR COLON CANCER: Primary | ICD-10-CM

## 2021-05-04 DIAGNOSIS — R79.89 ELEVATED LFTS: ICD-10-CM

## 2021-05-04 PROBLEM — Z12.11 SCREEN FOR COLON CANCER: Status: ACTIVE | Noted: 2021-05-04

## 2021-05-05 LAB
ALBUMIN SERPL-MCNC: 4.5 G/DL (ref 3.5–5.2)
ALP SERPL-CCNC: 93 U/L (ref 39–117)
ALT SERPL-CCNC: 30 U/L (ref 1–33)
AST SERPL-CCNC: 20 U/L (ref 1–32)
BILIRUB DIRECT SERPL-MCNC: <0.2 MG/DL (ref 0–0.3)
BILIRUB SERPL-MCNC: 0.4 MG/DL (ref 0–1.2)
PROT SERPL-MCNC: 6.6 G/DL (ref 6–8.5)

## 2021-08-12 ENCOUNTER — TELEMEDICINE (OUTPATIENT)
Dept: FAMILY MEDICINE CLINIC | Facility: CLINIC | Age: 61
End: 2021-08-12
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
## 2021-08-12 PROCEDURE — 99213 OFFICE O/P EST LOW 20 MIN: CPT | Performed by: FAMILY MEDICINE

## 2021-08-12 RX ORDER — CEPHALEXIN 500 MG/1
500 CAPSULE ORAL 3 TIMES DAILY
Qty: 21 CAPSULE | Refills: 0 | Status: SHIPPED | OUTPATIENT
Start: 2021-08-12 | End: 2021-08-19

## 2021-08-12 RX ORDER — HYDROXYZINE HYDROCHLORIDE 25 MG/1
25 TABLET, FILM COATED ORAL 3 TIMES DAILY PRN
Qty: 30 TABLET | Refills: 1 | Status: SHIPPED | OUTPATIENT
Start: 2021-08-12 | End: 2022-05-10

## 2021-08-12 RX ORDER — METHYLPREDNISOLONE 4 MG/1
TABLET ORAL
Qty: 21 TABLET | Refills: 0 | Status: SHIPPED | OUTPATIENT
Start: 2021-08-12 | End: 2022-05-10

## 2021-08-12 NOTE — PROGRESS NOTES
Subjective   Cailin Garcia is a 61 y.o. female.     CC: VV for Insect Bites    History of Present Illness     Pt seen today on a VV after receiving numerous insect (probably mosquito) bites over the upper torso and arms. Very itchy, yet several are painful with one on the inner/left arm with spreading redness. No f/c.      The following portions of the patient's history were reviewed and updated as appropriate: allergies, current medications, past family history, past medical history, past social history, past surgical history and problem list.    Review of Systems   Constitutional: Negative for activity change, chills and fever.   Respiratory: Negative for cough.    Cardiovascular: Negative for chest pain.   Skin: Positive for rash.   Psychiatric/Behavioral: Negative for dysphoric mood.       Objective   Physical Exam  Constitutional:       General: She is not in acute distress.     Appearance: She is well-developed.   Pulmonary:      Effort: Pulmonary effort is normal.   Skin:     Comments: Noted erythematous rash upper torso and left arm   Neurological:      Mental Status: She is alert and oriented to person, place, and time.   Psychiatric:         Behavior: Behavior normal.         Thought Content: Thought content normal.         Assessment/Plan   Diagnoses and all orders for this visit:    1. Insect bite, unspecified site, initial encounter (Primary)  -     methylPREDNISolone (Medrol) 4 MG dose pack; follow package directions  Dispense: 21 tablet; Refill: 0  -     cephalexin (Keflex) 500 MG capsule; Take 1 capsule by mouth 3 (Three) Times a Day for 7 days.  Dispense: 21 capsule; Refill: 0  -     hydrOXYzine (ATARAX) 25 MG tablet; Take 1 tablet by mouth 3 (Three) Times a Day As Needed for Itching.  Dispense: 30 tablet; Refill: 1      Spent  12   minutes with chart and interview and consent for this encounter given by the patient.  You have chosen to receive care through a telehealth visit.  Do you consent to  use a video/audio connection for your medical care today? Yes

## 2021-10-04 ENCOUNTER — APPOINTMENT (OUTPATIENT)
Dept: WOMENS IMAGING | Facility: HOSPITAL | Age: 61
End: 2021-10-04

## 2021-10-04 PROCEDURE — 77067 SCR MAMMO BI INCL CAD: CPT | Performed by: RADIOLOGY

## 2021-10-04 PROCEDURE — 77063 BREAST TOMOSYNTHESIS BI: CPT | Performed by: RADIOLOGY

## 2021-12-13 ENCOUNTER — HOSPITAL ENCOUNTER (OUTPATIENT)
Facility: HOSPITAL | Age: 61
Setting detail: HOSPITAL OUTPATIENT SURGERY
Discharge: HOME | End: 2021-12-13
Attending: SURGERY | Admitting: SURGERY

## 2021-12-13 ENCOUNTER — ANESTHESIA (OUTPATIENT)
Dept: GASTROENTEROLOGY | Facility: HOSPITAL | Age: 61
End: 2021-12-13

## 2021-12-13 ENCOUNTER — ANESTHESIA EVENT (OUTPATIENT)
Dept: GASTROENTEROLOGY | Facility: HOSPITAL | Age: 61
End: 2021-12-13

## 2021-12-13 VITALS
SYSTOLIC BLOOD PRESSURE: 99 MMHG | WEIGHT: 141 LBS | BODY MASS INDEX: 23.49 KG/M2 | DIASTOLIC BLOOD PRESSURE: 51 MMHG | HEIGHT: 65 IN | HEART RATE: 65 BPM | OXYGEN SATURATION: 99 % | RESPIRATION RATE: 16 BRPM

## 2021-12-13 PROCEDURE — 25010000002 GLUCAGON (RDNA) PER 1 MG: Performed by: SURGERY

## 2021-12-13 PROCEDURE — 25010000002 PROPOFOL 10 MG/ML EMULSION: Performed by: NURSE ANESTHETIST, CERTIFIED REGISTERED

## 2021-12-13 PROCEDURE — 45378 DIAGNOSTIC COLONOSCOPY: CPT | Performed by: SURGERY

## 2021-12-13 RX ORDER — PROPOFOL 10 MG/ML
VIAL (ML) INTRAVENOUS AS NEEDED
Status: DISCONTINUED | OUTPATIENT
Start: 2021-12-13 | End: 2021-12-13 | Stop reason: SURG

## 2021-12-13 RX ORDER — SODIUM CHLORIDE, SODIUM LACTATE, POTASSIUM CHLORIDE, CALCIUM CHLORIDE 600; 310; 30; 20 MG/100ML; MG/100ML; MG/100ML; MG/100ML
1000 INJECTION, SOLUTION INTRAVENOUS CONTINUOUS
Status: DISCONTINUED | OUTPATIENT
Start: 2021-12-13 | End: 2021-12-13 | Stop reason: HOSPADM

## 2021-12-13 RX ORDER — LIDOCAINE HYDROCHLORIDE 20 MG/ML
INJECTION, SOLUTION INFILTRATION; PERINEURAL AS NEEDED
Status: DISCONTINUED | OUTPATIENT
Start: 2021-12-13 | End: 2021-12-13 | Stop reason: SURG

## 2021-12-13 RX ORDER — SODIUM CHLORIDE 0.9 % (FLUSH) 0.9 %
10 SYRINGE (ML) INJECTION AS NEEDED
Status: DISCONTINUED | OUTPATIENT
Start: 2021-12-13 | End: 2021-12-13 | Stop reason: HOSPADM

## 2021-12-13 RX ORDER — PROPOFOL 10 MG/ML
VIAL (ML) INTRAVENOUS CONTINUOUS PRN
Status: DISCONTINUED | OUTPATIENT
Start: 2021-12-13 | End: 2021-12-13 | Stop reason: SURG

## 2021-12-13 RX ADMIN — SODIUM CHLORIDE, POTASSIUM CHLORIDE, SODIUM LACTATE AND CALCIUM CHLORIDE 1000 ML: 600; 310; 30; 20 INJECTION, SOLUTION INTRAVENOUS at 08:28

## 2021-12-13 RX ADMIN — Medication 300 MCG/KG/MIN: at 09:26

## 2021-12-13 RX ADMIN — LIDOCAINE HYDROCHLORIDE 50 MG: 20 INJECTION, SOLUTION INFILTRATION; PERINEURAL at 09:26

## 2021-12-13 RX ADMIN — PROPOFOL 100 MG: 10 INJECTION, EMULSION INTRAVENOUS at 09:26

## 2021-12-13 NOTE — OP NOTE
Colonoscopy Procedure Note  Cailin Garcia  1960  Date of Procedure: 12/13/21    Pre-operative Diagnosis:    · Screening, average risk    Post-operative Diagnosis:  · normal    Procedure: Colonoscopy         Recommendations:   · Colonoscopy in 10 years.   · Keep a copy of the photographs of the procedure given to you today for possible need for reference in the future.    Surgeon: Delia    Anesthetic: MAC per Mere Brown per CRNA  Scope Withdrawal Time:  6 minutes  10 seconds    Procedure Details     MAC anesthesia was induced.  The 180 Colonoscopy was inserted blindly into the rectum and advanced to the cecum, with relative ease,  without need for pressure, lift, or turning.  She did tend to loop and push thru was needed.   Cecum was identified by the appendiceal orifice and the ileocecal valve and photographed for documentation.      Prep quality was excellent.  A careful inspection was made as the scope was withdrawn, including a retroflexed view of the rectum; there was no suggestion of presence of angiodysplasias, colitis, polyps or diverticula, with no interventions.     Retroflexion in the rectum revealed no abnormalities.      Brianne Lin MD  12/13/21

## 2021-12-13 NOTE — H&P
Cc: Endoscopy Visit    HPI: 61 y.o. female here for screening with average risk.    Past Medical History:   Diagnosis Date   • H/O bone density study NEVER   • H/O complete eye exam DUE   • Hypothyroidism        Past Surgical History:   Procedure Laterality Date   • CHOLECYSTECTOMY  2002   • KIDNEY STONE SURGERY     • LASIK  2000   • MAMMO ABORTED BREAST BIOPSY UNILATERAL  06/2017   • SUBTOTAL HYSTERECTOMY      2007       is allergic to sulfa antibiotics.       Medication List      CONTINUE taking these medications    hydrOXYzine 25 MG tablet  Commonly known as: ATARAX  Take 1 tablet by mouth 3 (Three) Times a Day As Needed for Itching.     levothyroxine 25 MCG tablet  Commonly known as: Synthroid  Take 1 tablet by mouth Daily.     metroNIDAZOLE 0.75 % gel  Commonly known as: METROGEL        ASK your doctor about these medications    methylPREDNISolone 4 MG dose pack  Commonly known as: Medrol  follow package directions            History reviewed. No pertinent family history.    Social History     Socioeconomic History   • Marital status: Unknown   Tobacco Use   • Smoking status: Never Smoker   • Smokeless tobacco: Never Used   Substance and Sexual Activity   • Alcohol use: Yes     Comment: social   • Drug use: Defer   • Sexual activity: Defer       Vitals:    12/13/21 0946   BP: (S) (!) 81/39   Pulse: 64   Resp: 16   SpO2: 99%       Body mass index is 23.46 kg/m².    Physical Exam    General: No acute distress  Lungs: No labored breathing, Pulse oximetry on room air is 99%.  Heart/EKG: RRR  Abdomen: no complaints of pain  Mental:  Awake, alert, and oriented    Imp:     · Screening  · Average risk     Plan:  · Rhianna Lin MD  09:47 EST

## 2021-12-13 NOTE — DISCHARGE INSTRUCTIONS
For the next 24 hours patient needs to be with a responsible adult.    For 24 hours DO NOT drive, operate machinery, appliances, drink alcohol, make important decisions or sign legal documents.    Start with a light or bland diet and advance to regular diet as tolerated.    Follow recommendations on procedure report provided by your doctor.    Call Dr Lin for problems [phone]    Problems may include but not limited to: large amounts of bleeding, trouble breathing, repeated vomiting, severe unrelieved pain, fever or chills.

## 2021-12-13 NOTE — ANESTHESIA POSTPROCEDURE EVALUATION
Patient: Cailin Garcia    Procedure Summary     Date: 12/13/21 Room / Location:  [facility]    Anesthesia Start: 0923 Anesthesia Stop: 0941    Procedure: COLONOSCOPY to CECUM (N/A ) Diagnosis:       Screen for colon cancer      (Screen for colon cancer [Z12.11])    Surgeons: Brianne Lin MD Provider: Mere Lu MD    Anesthesia Type: MAC ASA Status: 2          Anesthesia Type: MAC    Vitals  Vitals Value Taken Time   BP 96/72 12/13/21 0956   Temp     Pulse 59 12/13/21 0956   Resp 16 12/13/21 0956   SpO2 99 % 12/13/21 0956           Post Anesthesia Care and Evaluation    Patient location during evaluation: bedside  Patient participation: complete - patient participated  Level of consciousness: awake  Pain management: adequate  Airway patency: patent  Anesthetic complications: No anesthetic complications    Cardiovascular status: acceptable  Respiratory status: acceptable  Hydration status: acceptable

## 2021-12-13 NOTE — ANESTHESIA PREPROCEDURE EVALUATION
Anesthesia Evaluation                  Airway   Mallampati: III  TM distance: >3 FB  Neck ROM: full  No difficulty expected  Dental          Pulmonary - normal exam   (-) not a smoker  Cardiovascular - normal exam        Neuro/Psych  GI/Hepatic/Renal/Endo    (+)   thyroid problem hypothyroidism    Musculoskeletal     Abdominal    Substance History      OB/GYN          Other                        Anesthesia Plan    ASA 2     MAC     intravenous induction     Anesthetic plan, all risks, benefits, and alternatives have been provided, discussed and informed consent has been obtained with: patient.

## 2022-05-06 ENCOUNTER — TELEPHONE (OUTPATIENT)
Dept: FAMILY MEDICINE CLINIC | Facility: CLINIC | Age: 62
End: 2022-05-06

## 2022-05-06 DIAGNOSIS — R79.89 ELEVATED LFTS: Primary | ICD-10-CM

## 2022-05-06 DIAGNOSIS — E03.9 ACQUIRED HYPOTHYROIDISM: ICD-10-CM

## 2022-05-06 DIAGNOSIS — Z00.00 GENERAL MEDICAL EXAM: ICD-10-CM

## 2022-05-10 LAB
ALBUMIN SERPL-MCNC: 4.5 G/DL (ref 3.8–4.8)
ALBUMIN/GLOB SERPL: 2.3 {RATIO} (ref 1.2–2.2)
ALP SERPL-CCNC: 106 IU/L (ref 44–121)
ALT SERPL-CCNC: 24 IU/L (ref 0–32)
AST SERPL-CCNC: 18 IU/L (ref 0–40)
BASOPHILS # BLD AUTO: 0 X10E3/UL (ref 0–0.2)
BASOPHILS NFR BLD AUTO: 1 %
BILIRUB SERPL-MCNC: 0.5 MG/DL (ref 0–1.2)
BUN SERPL-MCNC: 11 MG/DL (ref 8–27)
BUN/CREAT SERPL: 12 (ref 12–28)
CALCIUM SERPL-MCNC: 9.7 MG/DL (ref 8.7–10.3)
CHLORIDE SERPL-SCNC: 103 MMOL/L (ref 96–106)
CHOLEST SERPL-MCNC: 188 MG/DL (ref 100–199)
CO2 SERPL-SCNC: 23 MMOL/L (ref 20–29)
CREAT SERPL-MCNC: 0.93 MG/DL (ref 0.57–1)
EGFRCR SERPLBLD CKD-EPI 2021: 69 ML/MIN/1.73
EOSINOPHIL # BLD AUTO: 0.2 X10E3/UL (ref 0–0.4)
EOSINOPHIL NFR BLD AUTO: 3 %
ERYTHROCYTE [DISTWIDTH] IN BLOOD BY AUTOMATED COUNT: 13 % (ref 11.7–15.4)
GLOBULIN SER CALC-MCNC: 2 G/DL (ref 1.5–4.5)
GLUCOSE SERPL-MCNC: 91 MG/DL (ref 65–99)
HCT VFR BLD AUTO: 42.3 % (ref 34–46.6)
HDLC SERPL-MCNC: 56 MG/DL
HGB BLD-MCNC: 13.9 G/DL (ref 11.1–15.9)
IMM GRANULOCYTES # BLD AUTO: 0 X10E3/UL (ref 0–0.1)
IMM GRANULOCYTES NFR BLD AUTO: 0 %
LDLC SERPL CALC-MCNC: 107 MG/DL (ref 0–99)
LYMPHOCYTES # BLD AUTO: 2.2 X10E3/UL (ref 0.7–3.1)
LYMPHOCYTES NFR BLD AUTO: 38 %
MCH RBC QN AUTO: 30.8 PG (ref 26.6–33)
MCHC RBC AUTO-ENTMCNC: 32.9 G/DL (ref 31.5–35.7)
MCV RBC AUTO: 94 FL (ref 79–97)
MONOCYTES # BLD AUTO: 0.5 X10E3/UL (ref 0.1–0.9)
MONOCYTES NFR BLD AUTO: 8 %
NEUTROPHILS # BLD AUTO: 2.9 X10E3/UL (ref 1.4–7)
NEUTROPHILS NFR BLD AUTO: 50 %
PLATELET # BLD AUTO: 305 X10E3/UL (ref 150–450)
POTASSIUM SERPL-SCNC: 4.3 MMOL/L (ref 3.5–5.2)
PROT SERPL-MCNC: 6.5 G/DL (ref 6–8.5)
RBC # BLD AUTO: 4.51 X10E6/UL (ref 3.77–5.28)
SODIUM SERPL-SCNC: 141 MMOL/L (ref 134–144)
T3FREE SERPL-MCNC: 3 PG/ML (ref 2–4.4)
T4 FREE SERPL-MCNC: 1.28 NG/DL (ref 0.82–1.77)
TRIGL SERPL-MCNC: 144 MG/DL (ref 0–149)
TSH SERPL DL<=0.005 MIU/L-ACNC: 4.26 UIU/ML (ref 0.45–4.5)
VLDLC SERPL CALC-MCNC: 25 MG/DL (ref 5–40)
WBC # BLD AUTO: 5.8 X10E3/UL (ref 3.4–10.8)

## 2022-05-10 NOTE — PROGRESS NOTES
"Subjective   Cailin Garcia is a 61 y.o. female.     CC: Annual Exam    History of Present Illness     Cailin Garcia 61 y.o. female who presents for an Annual Wellness Visit.  she has a history of   Patient Active Problem List   Diagnosis   • Post-cholecystectomy syndrome   • Hypothyroidism   • Hair loss   • Screen for colon cancer   .  she has been feeling well.   I  reviewed health maintenance with her as part of my preventative care plan.    The following portions of the patient's history were reviewed and updated as appropriate: allergies, current medications, past family history, past medical history, past social history, past surgical history and problem list.    Review of Systems   Constitutional: Negative for appetite change, fever and unexpected weight change.   HENT: Negative for ear pain, facial swelling and sore throat.    Eyes: Negative for pain and visual disturbance.   Respiratory: Negative for chest tightness, shortness of breath and wheezing.    Cardiovascular: Negative for chest pain and palpitations.   Gastrointestinal: Negative for abdominal pain and blood in stool.   Endocrine: Negative.    Genitourinary: Negative for difficulty urinating and hematuria.   Musculoskeletal: Negative for joint swelling.   Neurological: Negative for tremors, seizures and syncope.   Hematological: Negative for adenopathy.   Psychiatric/Behavioral: Negative.        /52   Pulse 78   Temp 97.8 °F (36.6 °C) (Oral)   Ht 165.1 cm (65\")   Wt 65.3 kg (144 lb)   BMI 23.96 kg/m²     Objective   Physical Exam  Vitals and nursing note reviewed. Exam conducted with a chaperone present.   Constitutional:       Appearance: She is well-developed.   HENT:      Head: Normocephalic and atraumatic.      Right Ear: External ear normal.      Left Ear: External ear normal.      Mouth/Throat:      Pharynx: No oropharyngeal exudate.   Eyes:      General: No scleral icterus.     Conjunctiva/sclera: Conjunctivae normal.      " Pupils: Pupils are equal, round, and reactive to light.   Neck:      Thyroid: No thyromegaly.   Cardiovascular:      Rate and Rhythm: Normal rate and regular rhythm.      Heart sounds: No murmur heard.    No gallop.   Pulmonary:      Effort: Pulmonary effort is normal.      Breath sounds: Normal breath sounds. No wheezing or rales.   Abdominal:      General: Bowel sounds are normal. There is no distension.      Palpations: Abdomen is soft. There is no mass.      Tenderness: There is no abdominal tenderness.      Hernia: No hernia is present.   Musculoskeletal:         General: No tenderness or deformity. Normal range of motion.      Cervical back: Normal range of motion and neck supple.   Lymphadenopathy:      Cervical: No cervical adenopathy.   Skin:     General: Skin is warm and dry.      Findings: No rash.   Neurological:      Mental Status: She is alert and oriented to person, place, and time.      Deep Tendon Reflexes: Reflexes are normal and symmetric.   Psychiatric:         Behavior: Behavior normal.     Labs reviewed with pt today during visit. All questions answered.  Dona present for exam.      Diagnoses and all orders for this visit:    1. Annual physical exam (Primary)    2. Acquired hypothyroidism  -     levothyroxine (Synthroid) 25 MCG tablet; Take 1 tablet by mouth Daily.  Dispense: 90 tablet; Refill: 3    Healthy diet and exercise discussed.

## 2022-05-11 ENCOUNTER — OFFICE VISIT (OUTPATIENT)
Dept: FAMILY MEDICINE CLINIC | Facility: CLINIC | Age: 62
End: 2022-05-11

## 2022-05-11 VITALS
HEART RATE: 78 BPM | DIASTOLIC BLOOD PRESSURE: 52 MMHG | HEIGHT: 65 IN | SYSTOLIC BLOOD PRESSURE: 113 MMHG | WEIGHT: 144 LBS | TEMPERATURE: 97.8 F | BODY MASS INDEX: 23.99 KG/M2

## 2022-05-11 DIAGNOSIS — E03.9 ACQUIRED HYPOTHYROIDISM: Chronic | ICD-10-CM

## 2022-05-11 DIAGNOSIS — Z00.00 ANNUAL PHYSICAL EXAM: Primary | ICD-10-CM

## 2022-05-11 PROCEDURE — 99396 PREV VISIT EST AGE 40-64: CPT | Performed by: FAMILY MEDICINE

## 2022-05-11 RX ORDER — LEVOTHYROXINE SODIUM 0.03 MG/1
25 TABLET ORAL DAILY
Qty: 90 TABLET | Refills: 3 | Status: SHIPPED | OUTPATIENT
Start: 2022-05-11

## 2022-08-11 ENCOUNTER — OFFICE VISIT (OUTPATIENT)
Dept: FAMILY MEDICINE CLINIC | Facility: CLINIC | Age: 62
End: 2022-08-11

## 2022-08-11 VITALS
DIASTOLIC BLOOD PRESSURE: 74 MMHG | RESPIRATION RATE: 16 BRPM | BODY MASS INDEX: 24.16 KG/M2 | HEART RATE: 70 BPM | WEIGHT: 145 LBS | HEIGHT: 65 IN | SYSTOLIC BLOOD PRESSURE: 137 MMHG | TEMPERATURE: 98.1 F

## 2022-08-11 DIAGNOSIS — G62.9 NEUROPATHY: Primary | ICD-10-CM

## 2022-08-11 PROCEDURE — 99213 OFFICE O/P EST LOW 20 MIN: CPT | Performed by: FAMILY MEDICINE

## 2022-08-11 NOTE — PROGRESS NOTES
"Subjective   Cailin Garcia is a 62 y.o. female.     CC: \"Burning Pain\"    History of Present Illness     Pt comes in today reporting bilateral hand/feet \"burning\" for about 9 months. Had been going to her chiropractor w/o improvement. Awakens her at night but is now in the daytime, too. Pt reports no strength changes. Does report some escalation of symptoms with lifting and doing stuff at times, too.       The following portions of the patient's history were reviewed and updated as appropriate: allergies, current medications, past family history, past medical history, past social history, past surgical history and problem list.    Review of Systems   Constitutional: Negative for activity change, chills and fever.   Respiratory: Negative for cough.    Cardiovascular: Negative for chest pain.   Neurological: Positive for numbness.        Burning   Psychiatric/Behavioral: Negative for dysphoric mood.       /74   Pulse 70   Temp 98.1 °F (36.7 °C) (Oral)   Resp 16   Ht 165.1 cm (65\")   Wt 65.8 kg (145 lb)   BMI 24.13 kg/m²     Objective   Physical Exam  Constitutional:       General: She is not in acute distress.     Appearance: She is well-developed.   Cardiovascular:      Rate and Rhythm: Normal rate and regular rhythm.   Pulmonary:      Effort: Pulmonary effort is normal.      Breath sounds: Normal breath sounds.   Neurological:      Mental Status: She is alert and oriented to person, place, and time.   Psychiatric:         Behavior: Behavior normal.         Thought Content: Thought content normal.         Assessment & Plan   Diagnoses and all orders for this visit:    1. Neuropathy (Primary)  -     Vitamin B12  -     Folate  -     Ambulatory Referral to Neurology  -     MRI Cervical Spine Without Contrast; Future               "

## 2022-08-12 LAB
FOLATE SERPL-MCNC: 4.8 NG/ML
VIT B12 SERPL-MCNC: 298 PG/ML (ref 232–1245)

## 2022-09-07 VITALS — BODY MASS INDEX: 24.16 KG/M2 | HEIGHT: 65 IN | WEIGHT: 145 LBS

## 2022-09-07 PROBLEM — M50.30 DDD (DEGENERATIVE DISC DISEASE), CERVICAL: Status: ACTIVE | Noted: 2022-09-07

## 2022-09-07 NOTE — PROGRESS NOTES
Subjective   Cailin Garcia is a 62 y.o. female.     CC: VV for Management of Cervical DDD    History of Present Illness     Pt seen today on a VV after completing an MRI of the C-Spine due to chronic pain issues and was found to have multi-level DDD, although thankfully didn't appear surgical, and a Pain Mgmt consult was recommended. Instead, pt has been seeing her chiropractor.     The following portions of the patient's history were reviewed and updated as appropriate: allergies, current medications, past family history, past medical history, past social history, past surgical history and problem list.    Review of Systems   Constitutional: Negative for activity change, chills and fever.   Respiratory: Negative for cough.    Cardiovascular: Negative for chest pain.   Musculoskeletal: Positive for neck pain.   Psychiatric/Behavioral: Negative for dysphoric mood.       Objective   Physical Exam  Constitutional:       General: She is not in acute distress.     Appearance: She is well-developed.   Pulmonary:      Effort: Pulmonary effort is normal.   Neurological:      Mental Status: She is alert and oriented to person, place, and time.   Psychiatric:         Behavior: Behavior normal.         Thought Content: Thought content normal.     MRI report reviewed with pt at today's visit.  Labs reviewed with pt today during visit. All questions answered.    Assessment & Plan   Diagnoses and all orders for this visit:    1. DDD (degenerative disc disease), cervical (Primary)  -     Ambulatory Referral to Pain Management    2. B12 deficiency  -     Vitamin B12; Future    Pt currently on 1,000mg B12 daily and will recheck in a month. Will convert to IM B12 if number not improving. Pt also has appt with neurology due to her neuropathy sx and encouraged to keep that.  Spent 13    minutes with chart and interview and consent for this encounter given by the patient.  You have chosen to receive care through a telehealth visit.  Do  you consent to use a video/audio connection for your medical care today? Yes

## 2022-09-08 ENCOUNTER — TELEMEDICINE (OUTPATIENT)
Dept: FAMILY MEDICINE CLINIC | Facility: CLINIC | Age: 62
End: 2022-09-08

## 2022-09-08 DIAGNOSIS — E53.8 B12 DEFICIENCY: ICD-10-CM

## 2022-09-08 DIAGNOSIS — M50.30 DDD (DEGENERATIVE DISC DISEASE), CERVICAL: Primary | ICD-10-CM

## 2022-09-08 PROCEDURE — 99213 OFFICE O/P EST LOW 20 MIN: CPT | Performed by: FAMILY MEDICINE

## 2022-09-14 NOTE — PROGRESS NOTES
The patient has a pain history of the following:  Cervical disc disease    Previous interventions that the patient has received include:   Shoulder injections - Dr. Carroll     Pain medications include:  None     Other conservative modalities which the patient reports using include:  Physical Therapy: no  Chiropractor: yes  Neck or back surgery: no  Past pain management: no  Heat - no benefit   Ice - no benefit     Past Significant Surgical History:  None     HPI:       CHIEF COMPLAINT: Neck Pain    Cailin Garcia is a 62 y.o. female referred here by Dallas King MD. Cailin Garcia presents to the office for evaluation and treatment of Neck Pain      History of Present Illness   Onset:  9 months ago   Inciting Event:  None   Location:  Hands, feet and groin  Pain: Pain described as burning.   Severity:  Pain rated as a 3 /10.  Symptoms have been constant.  Exacerbation:  Lying down.   Alleviation:  Nothing in particular.  Avoidance of clothing items touching her hands and feet.   Associated Symptoms:   Denies new onset weakness or clumsiness.    Ambulates: Without assistive device.     She wakes during the night with very hot feet (at least 9 months).  She saw a foot doctor after this and was diagnosed with fungus under her toenails - this was treated but there was no difference in the burning.  She then saw a chiropractor and received adjustments in her neck without improvement in her symptoms.  She tells me that when she went on vacation to [facility], she was wearing flip-flops because her feet here so hot.  She also complains of this same type of pain in her groin and hands.     She has an appointment with neurology in two months.  Denies ever having EMG/NCS.  Denies discoloration of the symptomatic areas.  Denies radiating pain into her arms or legs.  Denies peripheral weakness.     She was hoping to avoid medications if possible.       PEG Assessment   What number best describes your pain on average in the  past week?5  What number best describes how, during the past week, pain has interfered with your enjoyment of life?7  What number best describes how, during the past week, pain has interfered with your general activity?  2        Current Outpatient Medications:   •  levothyroxine (Synthroid) 25 MCG tablet, Take 1 tablet by mouth Daily., Disp: 90 tablet, Rfl: 3  •  metroNIDAZOLE (METROGEL) 0.75 % gel, APPLY TO THE FACE DAILY AFTER WASHING, Disp: , Rfl:   •  vitamin B-12 (CYANOCOBALAMIN) 1000 MCG tablet, Take 1,000 mcg by mouth Daily., Disp: , Rfl:   •  vitamin D (ERGOCALCIFEROL) 1.25 MG (96523 UT) capsule capsule, Take 50,000 Units by mouth 1 (One) Time Per Week., Disp: , Rfl:   •  gabapentin (NEURONTIN) 300 MG capsule, Take 1 capsule PO nightly for 3 days.  If no side effects, increase to 1 capsule 2x/day.  After 3 days increase to 1 capsule 3x/day., Disp: 90 capsule, Rfl: 0    The following portions of the patient's history were reviewed and updated as appropriate: allergies, current medications, past family history, past medical history, past social history, past surgical history and problem list.      REVIEW OF PERTINENT MEDICAL DATA            5/9/22 Creatinine 0.93, Platelets 305 (10*3)    Review of Systems   Constitutional: Positive for fatigue. Negative for activity change, chills and fever.   HENT: Negative for congestion.    Eyes: Negative for visual disturbance.   Respiratory: Negative for chest tightness and shortness of breath.    Cardiovascular: Negative for chest pain.   Gastrointestinal: Negative for abdominal pain, constipation and diarrhea.   Genitourinary: Negative for difficulty urinating, dyspareunia and dysuria.   Musculoskeletal: Positive for neck pain.   Neurological: Positive for numbness (fingertips). Negative for dizziness, weakness, light-headedness and headaches.   Psychiatric/Behavioral: Positive for agitation and sleep disturbance. The patient is not nervous/anxious.      I have  "reviewed and confirmed the accuracy of the ROS as documented by the MA/LPN/RN Samara Bills MD      Vitals:    09/15/22 1250   Pulse: 77   Temp: 98 °F (36.7 °C)   SpO2: 97%   Weight: 65.5 kg (144 lb 6.4 oz)   Height: 165.1 cm (65\")   PainSc:   3   PainLoc: Hand         Objective   Physical Exam  Vitals reviewed.   Constitutional:       General: She is not in acute distress.  Pulmonary:      Effort: Pulmonary effort is normal. No respiratory distress.   Musculoskeletal:      Comments: Cervical Examination:  Appearance: Posterior scarring absent.  Range of Motion: diminished range with pain  Spurling's test is negative, bilateral.  Cervical paravertebral regions and transverse processes are tender.  Greater occipital nerves are not tender to palpation, bilateral. The Cervical Paraspinous, Trapezius, Levator and Rhomboid muscles are not tender to palpation, bilaterally.    Skin:     General: Skin is warm and dry.      Coloration: Skin is not pale.      Findings: No erythema, lesion or rash.   Neurological:      General: No focal deficit present.      Mental Status: She is alert.      Sensory: No sensory deficit.      Comments: Negative hernandez's sign bilaterally.   Psychiatric:         Mood and Affect: Mood normal.         Thought Content: Thought content normal.         Assessment & Plan   Diagnoses and all orders for this visit:    1. Neuropathy (Primary)  -     EMG & Nerve Conduction Test; Future  -     gabapentin (NEURONTIN) 300 MG capsule; Take 1 capsule PO nightly for 3 days.  If no side effects, increase to 1 capsule 2x/day.  After 3 days increase to 1 capsule 3x/day.  Dispense: 90 capsule; Refill: 0    2. Displacement of cervical intervertebral disc without myelopathy    3. Cervical spondylosis without myelopathy        - Baseline urine drug screen was obtained.  Routine UDS in office today as part of monitoring requirements for controlled substances.  The specimen was viewed and the immunoassay result " reviewed and is negative .  This specimen will be sent to [facility] laboratory for confirmation.      - Pertinent labs reviewed.   - Pertinent imaging reviewed.   - Compounded pain cream prescribed to use as needed for pain.   - If compounded pain cream does not work, she will start gabapentin 300mg and slowly increase up to 300mg TID.  Discussed medication with the patient.  Included in this discussion was the potential for side effects and adverse events.  Patient verbalized understanding and wished to proceed.  Prescription will be sent to pharmacy.   - Explained that her symptoms do not match a radicular pattern and her cervical MRI does not show cord signal abnormalities, so I am less suspicious of a cervical cause of her pain.  However, when palpating her cervical spinous processes, she feels a tingling sensation into her lower extremities.  It is also an odd presentation to have the same burning sensation that she has in her hands/feet in her groin - so it is not a typical description of peripheral neuropathy.   - EMG/NCS ordered to further evaluate symptoms.   - Agree with neurology consult.     --- Follow-up 1 month office visit            LURDES REPORT    As part of the patient's treatment plan, I am prescribing controlled substances. The patient has been made aware of appropriate use of such medications, including potential risk of somnolence, limited ability to drive and/or work safely, and the potential for dependence or overdose. It has also bee made clear that these medications are for use by this patient only, without concomitant use of alcohol or other substances unless prescribed.     As the clinician, I personally reviewed the LURDES from 9/15/22 while the patient was in the office today.    History and physical exam exhibit continued safe and appropriate use of controlled substances.         While examining this patient, I wore protective equipment including a mask, eye shield and gloves.  I  washed my hands before and after this patient encounter.  The patient wore a mask throughout the visit as well.     Samara Bills MD  Pain Management

## 2022-09-15 ENCOUNTER — OFFICE VISIT (OUTPATIENT)
Dept: PAIN MEDICINE | Facility: CLINIC | Age: 62
End: 2022-09-15

## 2022-09-15 VITALS
OXYGEN SATURATION: 97 % | WEIGHT: 144.4 LBS | TEMPERATURE: 98 F | HEART RATE: 77 BPM | BODY MASS INDEX: 24.06 KG/M2 | HEIGHT: 65 IN

## 2022-09-15 DIAGNOSIS — M50.20 DISPLACEMENT OF CERVICAL INTERVERTEBRAL DISC WITHOUT MYELOPATHY: ICD-10-CM

## 2022-09-15 DIAGNOSIS — G62.9 NEUROPATHY: Primary | ICD-10-CM

## 2022-09-15 DIAGNOSIS — M47.812 CERVICAL SPONDYLOSIS WITHOUT MYELOPATHY: ICD-10-CM

## 2022-09-15 LAB
POC AMPHETAMINES: NEGATIVE
POC BARBITURATES: NEGATIVE
POC BENZODIAZEPHINES: NEGATIVE
POC COCAINE: NEGATIVE
POC METHADONE: NEGATIVE
POC METHAMPHETAMINE SCREEN URINE: NEGATIVE
POC OPIATES: NEGATIVE
POC OXYCODONE: NEGATIVE
POC PHENCYCLIDINE: NEGATIVE
POC PROPOXYPHENE: NEGATIVE
POC THC: NEGATIVE
POC TRICYCLIC ANTIDEPRESSANTS: NEGATIVE

## 2022-09-15 PROCEDURE — 80305 DRUG TEST PRSMV DIR OPT OBS: CPT | Performed by: ANESTHESIOLOGY

## 2022-09-15 PROCEDURE — 99204 OFFICE O/P NEW MOD 45 MIN: CPT | Performed by: ANESTHESIOLOGY

## 2022-09-15 RX ORDER — LANOLIN ALCOHOL/MO/W.PET/CERES
1000 CREAM (GRAM) TOPICAL DAILY
COMMUNITY

## 2022-09-15 RX ORDER — ERGOCALCIFEROL 1.25 MG/1
50000 CAPSULE ORAL WEEKLY
COMMUNITY
End: 2022-12-13

## 2022-09-15 RX ORDER — GABAPENTIN 300 MG/1
CAPSULE ORAL
Qty: 90 CAPSULE | Refills: 0 | Status: SHIPPED | OUTPATIENT
Start: 2022-09-15 | End: 2022-10-18 | Stop reason: SDUPTHER

## 2022-09-15 NOTE — PATIENT INSTRUCTIONS
Dr. Gatica - Neurologist that performed EMG/NCS (nerve and muscle test to help us differentiate whether this is from your neck or just the peripheral nerves).     Some common side effects of gabapentin and/or pregabalin include sleepiness, swelling in hands or feet, depression, blurred vision, and drowsiness.  Please contact the clinic immediately with any significant side effects so that the treatment plan may be adjusted in a safe manner.    It is best to start taking your gabapentin prescription on a night when you have nothing planned the next morning.  It is best to not make important decisions or to drive until you know how this medication will affect you.  If you have been instructed to slowly increase the dose, it is best to do this (again) on a day where you have nothing planned.    - If you have severe side effects, stop the medication.   - If you are experiencing strong side effects, do not increase the dose until those side effects resolve.    - If you have pain relief at any dose, stay at that dose and do not increase it.

## 2022-10-17 ENCOUNTER — HOSPITAL ENCOUNTER (OUTPATIENT)
Dept: PET IMAGING | Facility: HOSPITAL | Age: 62
Discharge: HOME | End: 2022-10-17

## 2022-10-17 ENCOUNTER — APPOINTMENT (OUTPATIENT)
Dept: WOMENS IMAGING | Facility: HOSPITAL | Age: 62
End: 2022-10-17

## 2022-10-17 ENCOUNTER — TRANSCRIBE ORDERS (OUTPATIENT)
Dept: WOMENS IMAGING | Facility: HOSPITAL | Age: 62
End: 2022-10-17

## 2022-10-17 DIAGNOSIS — M81.0 HIGH RISK FOR FRACTURE DUE TO OSTEOPOROSIS BY DEXA SCAN: Primary | ICD-10-CM

## 2022-10-17 PROCEDURE — 77080 DXA BONE DENSITY AXIAL: CPT | Performed by: RADIOLOGY

## 2022-10-17 PROCEDURE — 77067 SCR MAMMO BI INCL CAD: CPT | Performed by: RADIOLOGY

## 2022-10-17 PROCEDURE — 77063 BREAST TOMOSYNTHESIS BI: CPT | Performed by: RADIOLOGY

## 2022-10-17 PROCEDURE — 77080 DXA BONE DENSITY AXIAL: CPT

## 2022-10-17 NOTE — PROGRESS NOTES
The patient has a pain history of the following:  Cervical disc disease  Cervical disc displacement  Cervical spondylosis  Neuropathy     Previous interventions that the patient has received include:   Shoulder injections - Dr. Carroll      Pain medications include:  Gabapentin    Previously: Compounded pain cream      Other conservative modalities which the patient reports using include:  Physical Therapy: no  Chiropractor: yes  Neck or back surgery: no  Past pain management: no  Heat - no benefit   Ice - no benefit      Past Significant Surgical History:  None     HPI:     CHIEF COMPLAINT Neck Pain  F/U neck pain- patient states that her pain has improved slightly since her last visit.     Subjective   Cailin Garcia is a 62 y.o. female  who presents for follow-up.  She has a history of chronic hand, feet, and groin pain that she describes as burning.  At her previous visit I prescribed compounded pain cream, as well as gabapentin with instructions to increase up to 300 mg 3 times daily.  I also ordered an EMG/nerve conduction study to further evaluate her symptoms and I agree with the neurology consult (scheduled 11/21/2022).    History of Present Illness  She states that she tried the compounded pain cream without any benefit.  When she started the gabapentin she had almost no pain.  Then after a few days, the pain returned.  Since increasing up to 3x/day, her daytime pain has improved significantly, however she continues to have symptoms at night.  She denies any side effects from the gabapentin.     Her EMG/NCS was performed in [place] and she was told that the test results showed normal nerves.  She was told that he thought she has a type of restless leg syndrome.         PEG Assessment   What number best describes your pain on average in the past week?6  What number best describes how, during the past week, pain has interfered with your enjoyment of life?8  What number best describes how, during the past  "week, pain has interfered with your general activity?  2    REVIEW OF PERTINENT MEDICAL DATA  No new      The following portions of the patient's history were reviewed and updated as appropriate: allergies, current medications, past family history, past medical history, past social history, past surgical history and problem list.    Review of Systems   Constitutional: Negative for activity change, chills, fatigue and fever.   HENT: Negative for congestion.    Eyes: Negative for visual disturbance.   Respiratory: Negative for chest tightness and shortness of breath.    Cardiovascular: Negative for chest pain.   Gastrointestinal: Negative for abdominal pain, constipation and diarrhea.   Genitourinary: Negative for difficulty urinating, dyspareunia and dysuria.   Musculoskeletal: Positive for neck pain.   Neurological: Positive for weakness (miguel hands) and numbness (miguel hands). Negative for dizziness, light-headedness and headaches.   Psychiatric/Behavioral: Positive for sleep disturbance. Negative for agitation. The patient is not nervous/anxious.      I have reviewed and confirmed the accuracy of the ROS as documented by the MA/LPN/RN Samara Bills MD    Vitals:    10/18/22 1109   BP: 133/74   Pulse: 74   Temp: 97.5 °F (36.4 °C)   SpO2: 99%   Weight: 67.3 kg (148 lb 6.4 oz)   Height: 165.1 cm (65\")   PainSc:   1   PainLoc: Hand         Objective   Physical Exam  Vitals reviewed.   Constitutional:       General: She is not in acute distress.  Pulmonary:      Effort: Pulmonary effort is normal. No respiratory distress.   Neurological:      General: No focal deficit present.      Mental Status: She is alert.   Psychiatric:         Mood and Affect: Mood normal.         Thought Content: Thought content normal.             Assessment & Plan   Diagnoses and all orders for this visit:    1. Neuropathy (Primary)  -     gabapentin (NEURONTIN) 300 MG capsule; Take 1 capsule PO in the morning and afternoon, take 2 at night.  " May increase up to 3 at night.  Dispense: 150 capsule; Refill: 0          - Will increase gabapentin to [id number].  If pain is not well-controlled on this dose, may increase up to [id number].    - We will obtain the results from her EMG/NCS and scan into media.    - ADDENDUM: EMG results show very mild bilateral carpal tunnel syndrome and Dr. Jamar Lizarraga MD suggested diagnosis of restless leg syndrome.      --- Follow-up 1 month office visit.            LURDES REPORT    As part of the patient's treatment plan, I am prescribing controlled substances. The patient has been made aware of appropriate use of such medications, including potential risk of somnolence, limited ability to drive and/or work safely, and the potential for dependence or overdose. It has also bee made clear that these medications are for use by this patient only, without concomitant use of alcohol or other substances unless prescribed.     As the clinician, I personally reviewed the LURDES from 10/18/22.    History and physical exam exhibit continued safe and appropriate use of controlled substances.    While examining this patient, I wore protective equipment including a mask, eye shield and gloves.  I washed my hands before and after this patient encounter.  The patient wore a mask throughout the visit as well.     Samara Bills MD  Pain Management

## 2022-10-18 ENCOUNTER — OFFICE VISIT (OUTPATIENT)
Dept: PAIN MEDICINE | Facility: CLINIC | Age: 62
End: 2022-10-18

## 2022-10-18 VITALS
BODY MASS INDEX: 24.72 KG/M2 | DIASTOLIC BLOOD PRESSURE: 74 MMHG | TEMPERATURE: 97.5 F | HEIGHT: 65 IN | SYSTOLIC BLOOD PRESSURE: 133 MMHG | WEIGHT: 148.4 LBS | OXYGEN SATURATION: 99 % | HEART RATE: 74 BPM

## 2022-10-18 DIAGNOSIS — G62.9 NEUROPATHY: Primary | ICD-10-CM

## 2022-10-18 PROCEDURE — 99214 OFFICE O/P EST MOD 30 MIN: CPT | Performed by: ANESTHESIOLOGY

## 2022-10-18 RX ORDER — GABAPENTIN 300 MG/1
CAPSULE ORAL
Qty: 150 CAPSULE | Refills: 0 | Status: SHIPPED | OUTPATIENT
Start: 2022-10-18 | End: 2022-11-22 | Stop reason: SDUPTHER

## 2022-10-18 NOTE — PATIENT INSTRUCTIONS
Will increase gabapentin to [id number].  If pain is not well-controlled on this dose, may increase up to [id number].

## 2022-11-21 NOTE — PROGRESS NOTES
The patient has a pain history of the following:  Cervical disc disease  Cervical disc displacement  Cervical spondylosis  Neuropathy     Previous interventions that the patient has received include:   Shoulder injections - Dr. Carroll      Pain medications include:  Gabapentin     Previously: Compounded pain cream (no benefit)     Other conservative modalities which the patient reports using include:  Physical Therapy: no  Chiropractor: yes  Neck or back surgery: no  Past pain management: no  Heat - no benefit   Ice - no benefit      Past Significant Surgical History:  None     HPI:     CHIEF COMPLAINT Neck Pain and Hand Pain (Bilat/)  F/u bilat hand pain. Pt sts pain has improved since last ov.     Subjective   Cailin Garcia is a 62 y.o. female  who presents for follow-up.  She has a history of chronic hand, feet, and groin pain that she describes as burning.  At her previous visit I increased her gabapentin to 300 mg - 300 mg-600 mg.  If pain is not controlled on this dose, we may increase up to 900 mg at night.    History of Present Illness  She tells me today that she could not tolerate taking 2 gabapentin at night due to stomach discomfort.  She therefore, spread the gabapentin into 4 doses throughout the day.  She is significantly improved from taking this medication.  She asks today about her EMG results and requests to try the medication for restless leg syndrome.        PEG Assessment   What number best describes your pain on average in the past week?2  What number best describes how, during the past week, pain has interfered with your enjoyment of life?2  What number best describes how, during the past week, pain has interfered with your general activity?  2    REVIEW OF PERTINENT MEDICAL DATA                  The following portions of the patient's history were reviewed and updated as appropriate: allergies, current medications, past family history, past medical history, past social history, past  "surgical history and problem list.    Review of Systems   Constitutional: Negative for activity change, fatigue and fever.   HENT: Negative for congestion.    Eyes: Negative for visual disturbance.   Respiratory: Negative for cough and shortness of breath.    Cardiovascular: Negative for chest pain.   Gastrointestinal: Negative for constipation and diarrhea.   Genitourinary: Negative for difficulty urinating.   Musculoskeletal: Positive for arthralgias (bilat hand and feet) and neck pain. Negative for neck stiffness.   Neurological: Negative for dizziness, weakness, light-headedness, numbness and headaches.   Psychiatric/Behavioral: Negative for agitation, sleep disturbance and suicidal ideas. The patient is not nervous/anxious.      I have reviewed and confirmed the accuracy of the ROS as documented by the MA/LPN/RN Samara Bills MD    Vitals:    11/22/22 1250   BP: 103/60   Pulse: 81   Resp: 20   Temp: 97.7 °F (36.5 °C)   SpO2: 98%   Weight: 67.6 kg (149 lb)   Height: 165.1 cm (65\")   PainSc:   2   PainLoc: Hand  Comment: bilat         Objective   Physical Exam  Vitals reviewed.   Constitutional:       General: She is not in acute distress.  Pulmonary:      Effort: Pulmonary effort is normal. No respiratory distress.   Neurological:      General: No focal deficit present.      Mental Status: She is alert.   Psychiatric:         Mood and Affect: Mood normal.         Thought Content: Thought content normal.             Assessment & Plan   Diagnoses and all orders for this visit:    1. Neuropathy (Primary)  -     gabapentin (NEURONTIN) 300 MG capsule; Take 1 capsule by mouth 4 (Four) Times a Day.  Dispense: 120 capsule; Refill: 5    2. Chronic pain syndrome          - Explained her EMG results suggest restless leg syndrome.  I do not treat RLS and advised her to reach out to Dr. King to see if he has suggestions for treatment.   - If she decides to stop gabapentin, I have advised her to slowly decrease it from 4 " per day to 3 per day, 2 and 1 and discontinue.        --- Follow-up 6 months office visit            LURDES REPORT    As part of the patient's treatment plan, I am prescribing controlled substances. The patient has been made aware of appropriate use of such medications, including potential risk of somnolence, limited ability to drive and/or work safely, and the potential for dependence or overdose. It has also bee made clear that these medications are for use by this patient only, without concomitant use of alcohol or other substances unless prescribed.     As the clinician, I personally reviewed the LURDES from 11/22/22 .    History and physical exam exhibit continued safe and appropriate use of controlled substances.    While examining this patient, I wore protective equipment including a mask and gloves.  I washed my hands before and after this patient encounter.  The patient wore a mask throughout the visit as well.     Samara Bills MD  Pain Management    EMR Dragon/Transcription disclaimer:   Much of this encounter note is an electronic transcription/translation of spoken language to printed text. The electronic translation of spoken language may permit erroneous, or at times, nonsensical words or phrases to be inadvertently transcribed; Although I have reviewed the note for such errors, some may still exist.

## 2022-11-22 ENCOUNTER — OFFICE VISIT (OUTPATIENT)
Dept: PAIN MEDICINE | Facility: CLINIC | Age: 62
End: 2022-11-22

## 2022-11-22 VITALS
RESPIRATION RATE: 20 BRPM | OXYGEN SATURATION: 98 % | WEIGHT: 149 LBS | HEIGHT: 65 IN | SYSTOLIC BLOOD PRESSURE: 103 MMHG | DIASTOLIC BLOOD PRESSURE: 60 MMHG | TEMPERATURE: 97.7 F | HEART RATE: 81 BPM | BODY MASS INDEX: 24.83 KG/M2

## 2022-11-22 DIAGNOSIS — G62.9 NEUROPATHY: Primary | ICD-10-CM

## 2022-11-22 DIAGNOSIS — G89.4 CHRONIC PAIN SYNDROME: ICD-10-CM

## 2022-11-22 PROCEDURE — 99214 OFFICE O/P EST MOD 30 MIN: CPT | Performed by: ANESTHESIOLOGY

## 2022-11-22 RX ORDER — ALENDRONATE SODIUM 70 MG/1
TABLET ORAL
COMMUNITY
Start: 2022-10-19 | End: 2022-11-22

## 2022-11-22 RX ORDER — GABAPENTIN 300 MG/1
300 CAPSULE ORAL 4 TIMES DAILY
Qty: 120 CAPSULE | Refills: 5 | Status: SHIPPED | OUTPATIENT
Start: 2022-11-22

## 2022-11-30 ENCOUNTER — TELEPHONE (OUTPATIENT)
Dept: FAMILY MEDICINE CLINIC | Facility: CLINIC | Age: 62
End: 2022-11-30

## 2022-11-30 DIAGNOSIS — E03.9 ACQUIRED HYPOTHYROIDISM: Primary | ICD-10-CM

## 2022-11-30 DIAGNOSIS — E53.8 B12 DEFICIENCY: ICD-10-CM

## 2022-11-30 DIAGNOSIS — E78.5 HYPERLIPIDEMIA, UNSPECIFIED HYPERLIPIDEMIA TYPE: ICD-10-CM

## 2022-11-30 DIAGNOSIS — R79.89 ELEVATED LFTS: ICD-10-CM

## 2022-11-30 DIAGNOSIS — M50.30 DDD (DEGENERATIVE DISC DISEASE), CERVICAL: ICD-10-CM

## 2022-11-30 NOTE — TELEPHONE ENCOUNTER
Caller: Cailin Garcia    Relationship to patient: Self    Best call back number: [phone]    Type of visit: LABS BEFORE 12/7 OFFICE VISIT    Additional notes:ATTEMPTED TO WARM TRANSFER

## 2022-12-02 LAB
ALBUMIN SERPL-MCNC: 4.2 G/DL (ref 3.5–5.2)
ALBUMIN/GLOB SERPL: 1.9 G/DL
ALP SERPL-CCNC: 107 U/L (ref 39–117)
ALT SERPL-CCNC: 44 U/L (ref 1–33)
AST SERPL-CCNC: 19 U/L (ref 1–32)
BASOPHILS # BLD AUTO: 0.04 10*3/MM3 (ref 0–0.2)
BASOPHILS NFR BLD AUTO: 0.7 % (ref 0–1.5)
BILIRUB SERPL-MCNC: 0.5 MG/DL (ref 0–1.2)
BUN SERPL-MCNC: 9 MG/DL (ref 8–23)
BUN/CREAT SERPL: 10 (ref 7–25)
CALCIUM SERPL-MCNC: 9.2 MG/DL (ref 8.6–10.5)
CHLORIDE SERPL-SCNC: 102 MMOL/L (ref 98–107)
CHOLEST SERPL-MCNC: 211 MG/DL (ref 0–200)
CO2 SERPL-SCNC: 26.7 MMOL/L (ref 22–29)
CREAT SERPL-MCNC: 0.9 MG/DL (ref 0.57–1)
EGFRCR SERPLBLD CKD-EPI 2021: 72 ML/MIN/1.73
EOSINOPHIL # BLD AUTO: 0.18 10*3/MM3 (ref 0–0.4)
EOSINOPHIL NFR BLD AUTO: 3.2 % (ref 0.3–6.2)
ERYTHROCYTE [DISTWIDTH] IN BLOOD BY AUTOMATED COUNT: 12.5 % (ref 12.3–15.4)
FOLATE SERPL-MCNC: 4.47 NG/ML (ref 4.78–24.2)
GLOBULIN SER CALC-MCNC: 2.2 GM/DL
GLUCOSE SERPL-MCNC: 99 MG/DL (ref 65–99)
HCT VFR BLD AUTO: 40.8 % (ref 34–46.6)
HDLC SERPL-MCNC: 57 MG/DL (ref 40–60)
HGB BLD-MCNC: 14.2 G/DL (ref 12–15.9)
IMM GRANULOCYTES # BLD AUTO: 0.02 10*3/MM3 (ref 0–0.05)
IMM GRANULOCYTES NFR BLD AUTO: 0.4 % (ref 0–0.5)
LDLC SERPL CALC-MCNC: 130 MG/DL (ref 0–100)
LYMPHOCYTES # BLD AUTO: 2.09 10*3/MM3 (ref 0.7–3.1)
LYMPHOCYTES NFR BLD AUTO: 37.2 % (ref 19.6–45.3)
MCH RBC QN AUTO: 31.2 PG (ref 26.6–33)
MCHC RBC AUTO-ENTMCNC: 34.8 G/DL (ref 31.5–35.7)
MCV RBC AUTO: 89.7 FL (ref 79–97)
MONOCYTES # BLD AUTO: 0.42 10*3/MM3 (ref 0.1–0.9)
MONOCYTES NFR BLD AUTO: 7.5 % (ref 5–12)
NEUTROPHILS # BLD AUTO: 2.87 10*3/MM3 (ref 1.7–7)
NEUTROPHILS NFR BLD AUTO: 51 % (ref 42.7–76)
NRBC BLD AUTO-RTO: 0 /100 WBC (ref 0–0.2)
PLATELET # BLD AUTO: 286 10*3/MM3 (ref 140–450)
POTASSIUM SERPL-SCNC: 4.3 MMOL/L (ref 3.5–5.2)
PROT SERPL-MCNC: 6.4 G/DL (ref 6–8.5)
RBC # BLD AUTO: 4.55 10*6/MM3 (ref 3.77–5.28)
SODIUM SERPL-SCNC: 140 MMOL/L (ref 136–145)
T3FREE SERPL-MCNC: 2.7 PG/ML (ref 2–4.4)
T4 FREE SERPL-MCNC: 1.12 NG/DL (ref 0.93–1.7)
TRIGL SERPL-MCNC: 136 MG/DL (ref 0–150)
TSH SERPL DL<=0.005 MIU/L-ACNC: 3.43 UIU/ML (ref 0.27–4.2)
VIT B12 SERPL-MCNC: 1289 PG/ML (ref 211–946)
VLDLC SERPL CALC-MCNC: 24 MG/DL (ref 5–40)
WBC # BLD AUTO: 5.62 10*3/MM3 (ref 3.4–10.8)

## 2022-12-06 PROBLEM — E53.8 FOLATE DEFICIENCY: Status: ACTIVE | Noted: 2022-12-06

## 2022-12-06 PROBLEM — G25.81 RLS (RESTLESS LEGS SYNDROME): Status: ACTIVE | Noted: 2022-12-06

## 2022-12-06 NOTE — PROGRESS NOTES
Subjective   Cailin Garcia is a 62 y.o. female.     History of Present Illness     Chief Complaint:   Chief Complaint   Patient presents with   • Restless Legs Syndrome     Med refill due  / lab results  / multiple issues to discuss per pt    • follow up [facility]      To discuss per pt        Cailin Garcia 62 y.o. female who presents today for Medical Management of the below listed issues. She  has a problem list of   Patient Active Problem List   Diagnosis   • Post-cholecystectomy syndrome   • Hypothyroidism   • Hair loss   • Screen for colon cancer   • DDD (degenerative disc disease), cervical   • B12 deficiency   • RLS (restless legs syndrome)   • Folate deficiency   .  Since the last visit, She has been seeing Pain Management, last visit 11/22, with this note:    History of Present Illness   She tells me today that she could not tolerate taking 2 gabapentin at night due to stomach discomfort. She therefore, spread the gabapentin into 4 doses throughout the day. She is significantly improved from taking this medication. She asks today about her EMG results and requests to try the medication for restless leg syndrome.    - Explained her EMG results suggest restless leg syndrome. I do not treat RLS and advised her to reach out to Dr. King to see if he has suggestions for treatment.   - If she decides to stop gabapentin, I have advised her to slowly decrease it from 4 per day to 3 per day, 2 and 1 and discontinue.   Pt's OB/GYN also recently completed a DEXA Scan, showing a T-Score of the Spine of -2.4.      Pt also had a recent sudden-onset event where she had visual disturbance and difficulty speaking. Took a ASA and the sx resolved over the next 3-4 hours. No limb weakness. Went to the ophthalmologist and reports normal exam.  They ordered an MRI to be completed next week. Pt does have a neurologist (Dr Lizarraga in [place]). No prior h/o migraines and no FH of.      she has been compliant with   Current  "Outpatient Medications:   •  folic acid (FOLVITE) 1 MG tablet, Take 1 tablet by mouth Daily., Disp: 90 tablet, Rfl: 3  •  gabapentin (NEURONTIN) 300 MG capsule, Take 1 capsule by mouth 4 (Four) Times a Day., Disp: 120 capsule, Rfl: 5  •  levothyroxine (Synthroid) 25 MCG tablet, Take 1 tablet by mouth Daily., Disp: 90 tablet, Rfl: 3  •  metroNIDAZOLE (METROGEL) 0.75 % gel, APPLY TO THE FACE DAILY AFTER WASHING, Disp: , Rfl:   •  pramipexole (Mirapex) 0.5 MG tablet, Take 1 tablet by mouth Every Night., Disp: 90 tablet, Rfl: 1  •  vitamin B-12 (CYANOCOBALAMIN) 1000 MCG tablet, Take 1,000 mcg by mouth Daily., Disp: , Rfl:   •  vitamin D (ERGOCALCIFEROL) 1.25 MG (31336 UT) capsule capsule, Take 50,000 Units by mouth 1 (One) Time Per Week., Disp: , Rfl: .  She denies medication side effects.    All of the other chronic condition(s) listed above are stable w/o issues.    /73   Pulse 78   Temp 97.4 °F (36.3 °C) (Oral)   Resp 16   Ht 165.1 cm (65\")   Wt 68 kg (150 lb)   BMI 24.96 kg/m²     Results for orders placed or performed in visit on 11/30/22   Comprehensive metabolic panel    Specimen: Blood   Result Value Ref Range    Glucose 99 65 - 99 mg/dL    BUN 9 8 - 23 mg/dL    Creatinine 0.90 0.57 - 1.00 mg/dL    EGFR Result 72.0 >60.0 mL/min/1.73    BUN/Creatinine Ratio 10.0 7.0 - 25.0    Sodium 140 136 - 145 mmol/L    Potassium 4.3 3.5 - 5.2 mmol/L    Chloride 102 98 - 107 mmol/L    Total CO2 26.7 22.0 - 29.0 mmol/L    Calcium 9.2 8.6 - 10.5 mg/dL    Total Protein 6.4 6.0 - 8.5 g/dL    Albumin 4.20 3.50 - 5.20 g/dL    Globulin 2.2 gm/dL    A/G Ratio 1.9 g/dL    Total Bilirubin 0.5 0.0 - 1.2 mg/dL    Alkaline Phosphatase 107 39 - 117 U/L    AST (SGOT) 19 1 - 32 U/L    ALT (SGPT) 44 (H) 1 - 33 U/L   Lipid panel    Specimen: Blood   Result Value Ref Range    Total Cholesterol 211 (H) 0 - 200 mg/dL    Triglycerides 136 0 - 150 mg/dL    HDL Cholesterol 57 40 - 60 mg/dL    VLDL Cholesterol Raymond 24 5 - 40 mg/dL    LDL " Chol Calc (NIH) 130 (H) 0 - 100 mg/dL   TSH    Specimen: Blood   Result Value Ref Range    TSH 3.430 0.270 - 4.200 uIU/mL   Vitamin B12    Specimen: Blood   Result Value Ref Range    Vitamin B-12 1,289 (H) 211 - 946 pg/mL   Folate    Specimen: Blood   Result Value Ref Range    Folate 4.47 (L) 4.78 - 24.20 ng/mL   T3, Free    Specimen: Blood   Result Value Ref Range    T3, Free 2.7 2.0 - 4.4 pg/mL   T4, Free    Specimen: Blood   Result Value Ref Range    Free T4 1.12 0.93 - 1.70 ng/dL   CBC and Differential    Specimen: Blood   Result Value Ref Range    WBC 5.62 3.40 - 10.80 10*3/mm3    RBC 4.55 3.77 - 5.28 10*6/mm3    Hemoglobin 14.2 12.0 - 15.9 g/dL    Hematocrit 40.8 34.0 - 46.6 %    MCV 89.7 79.0 - 97.0 fL    MCH 31.2 26.6 - 33.0 pg    MCHC 34.8 31.5 - 35.7 g/dL    RDW 12.5 12.3 - 15.4 %    Platelets 286 140 - 450 10*3/mm3    Neutrophil Rel % 51.0 42.7 - 76.0 %    Lymphocyte Rel % 37.2 19.6 - 45.3 %    Monocyte Rel % 7.5 5.0 - 12.0 %    Eosinophil Rel % 3.2 0.3 - 6.2 %    Basophil Rel % 0.7 0.0 - 1.5 %    Neutrophils Absolute 2.87 1.70 - 7.00 10*3/mm3    Lymphocytes Absolute 2.09 0.70 - 3.10 10*3/mm3    Monocytes Absolute 0.42 0.10 - 0.90 10*3/mm3    Eosinophils Absolute 0.18 0.00 - 0.40 10*3/mm3    Basophils Absolute 0.04 0.00 - 0.20 10*3/mm3    Immature Granulocyte Rel % 0.4 0.0 - 0.5 %    Immature Grans Absolute 0.02 0.00 - 0.05 10*3/mm3    nRBC 0.0 0.0 - 0.2 /100 WBC             The following portions of the patient's history were reviewed and updated as appropriate: allergies, current medications, past family history, past medical history, past social history, past surgical history, and problem list.    Review of Systems   Constitutional: Negative for activity change, chills and fever.   Respiratory: Negative for cough.    Cardiovascular: Negative for chest pain.   Psychiatric/Behavioral: Negative for dysphoric mood.       Objective   Physical Exam  Constitutional:       General: She is not in acute  distress.     Appearance: She is well-developed.   Cardiovascular:      Rate and Rhythm: Normal rate and regular rhythm.   Pulmonary:      Effort: Pulmonary effort is normal.      Breath sounds: Normal breath sounds.   Neurological:      Mental Status: She is alert and oriented to person, place, and time.   Psychiatric:         Behavior: Behavior normal.         Thought Content: Thought content normal.     Labs reviewed with pt today during visit. All questions answered.  Pain Mgmt notes reviewed by me at today's visit.        Diagnoses and all orders for this visit:    1. RLS (restless legs syndrome) (Primary)  -     pramipexole (Mirapex) 0.5 MG tablet; Take 1 tablet by mouth Every Night.  Dispense: 90 tablet; Refill: 1    2. Folate deficiency  -     folic acid (FOLVITE) 1 MG tablet; Take 1 tablet by mouth Daily.  Dispense: 90 tablet; Refill: 3    3. Neurologic abnormality  -     Adult Transthoracic Echo Complete W/ Cont if Necessary Per Protocol; Future  -     Duplex Carotid Ultrasound CAR; Future    4. Visual field scotoma of both eyes  -     Adult Transthoracic Echo Complete W/ Cont if Necessary Per Protocol; Future  -     Duplex Carotid Ultrasound CAR; Future    5. Dysphasia  -     Adult Transthoracic Echo Complete W/ Cont if Necessary Per Protocol; Future  -     Duplex Carotid Ultrasound CAR; Future    Discussed DEXA scan and pt didn't tolerate Fosamax at all. Pt to do weight-bearing exercises and Calcium with D and will recheck in 2 years.  Pt to contact her Neurologist today and make them aware, complete the MRI and U/S/ECHO, and then f/u there. Pt on ASA 81mg.

## 2022-12-07 ENCOUNTER — OFFICE VISIT (OUTPATIENT)
Dept: FAMILY MEDICINE CLINIC | Facility: CLINIC | Age: 62
End: 2022-12-07

## 2022-12-07 VITALS
TEMPERATURE: 97.4 F | BODY MASS INDEX: 24.99 KG/M2 | WEIGHT: 150 LBS | HEART RATE: 78 BPM | SYSTOLIC BLOOD PRESSURE: 134 MMHG | HEIGHT: 65 IN | RESPIRATION RATE: 16 BRPM | DIASTOLIC BLOOD PRESSURE: 73 MMHG

## 2022-12-07 DIAGNOSIS — E53.8 FOLATE DEFICIENCY: ICD-10-CM

## 2022-12-07 DIAGNOSIS — G25.81 RLS (RESTLESS LEGS SYNDROME): Primary | ICD-10-CM

## 2022-12-07 DIAGNOSIS — R29.818 NEUROLOGIC ABNORMALITY: ICD-10-CM

## 2022-12-07 DIAGNOSIS — R47.02 DYSPHASIA: ICD-10-CM

## 2022-12-07 DIAGNOSIS — H53.413 VISUAL FIELD SCOTOMA OF BOTH EYES: ICD-10-CM

## 2022-12-07 PROCEDURE — 99214 OFFICE O/P EST MOD 30 MIN: CPT | Performed by: FAMILY MEDICINE

## 2022-12-07 RX ORDER — PRAMIPEXOLE DIHYDROCHLORIDE 0.5 MG/1
0.5 TABLET ORAL NIGHTLY
Qty: 90 TABLET | Refills: 1 | Status: SHIPPED | OUTPATIENT
Start: 2022-12-07 | End: 2022-12-12 | Stop reason: SINTOL

## 2022-12-07 RX ORDER — FOLIC ACID 1 MG/1
1 TABLET ORAL DAILY
Qty: 90 TABLET | Refills: 3 | Status: SHIPPED | OUTPATIENT
Start: 2022-12-07

## 2022-12-12 DIAGNOSIS — G25.81 RLS (RESTLESS LEGS SYNDROME): Primary | ICD-10-CM

## 2022-12-12 RX ORDER — ROPINIROLE 0.25 MG/1
0.25 TABLET, FILM COATED ORAL NIGHTLY
Qty: 90 TABLET | Refills: 1 | Status: SHIPPED | OUTPATIENT
Start: 2022-12-12

## 2022-12-13 ENCOUNTER — APPOINTMENT (OUTPATIENT)
Dept: MRI IMAGING | Facility: HOSPITAL | Age: 62
End: 2022-12-13

## 2022-12-13 ENCOUNTER — HOSPITAL ENCOUNTER (OUTPATIENT)
Facility: HOSPITAL | Age: 62
Discharge: HOME | End: 2022-12-13
Attending: EMERGENCY MEDICINE | Admitting: EMERGENCY MEDICINE

## 2022-12-13 ENCOUNTER — APPOINTMENT (OUTPATIENT)
Dept: CT IMAGING | Facility: HOSPITAL | Age: 62
End: 2022-12-13

## 2022-12-13 ENCOUNTER — READMISSION MANAGEMENT (OUTPATIENT)
Dept: CALL CENTER | Facility: HOSPITAL | Age: 62
End: 2022-12-13

## 2022-12-13 VITALS
BODY MASS INDEX: 24.99 KG/M2 | HEART RATE: 72 BPM | SYSTOLIC BLOOD PRESSURE: 142 MMHG | DIASTOLIC BLOOD PRESSURE: 75 MMHG | HEIGHT: 65 IN | WEIGHT: 150 LBS | TEMPERATURE: 97.8 F | RESPIRATION RATE: 18 BRPM | OXYGEN SATURATION: 100 %

## 2022-12-13 DIAGNOSIS — H53.40 VISUAL FIELD DEFECT: ICD-10-CM

## 2022-12-13 DIAGNOSIS — R47.01 EXPRESSIVE APHASIA: Primary | ICD-10-CM

## 2022-12-13 DIAGNOSIS — R20.2 FACIAL PARESTHESIA: ICD-10-CM

## 2022-12-13 PROBLEM — G45.9 TIA (TRANSIENT ISCHEMIC ATTACK): Status: ACTIVE | Noted: 2022-12-13

## 2022-12-13 LAB
ALBUMIN SERPL-MCNC: 4 G/DL (ref 3.5–5.2)
ALBUMIN/GLOB SERPL: 1.8 G/DL
ALP SERPL-CCNC: 96 U/L (ref 39–117)
ALT SERPL W P-5'-P-CCNC: 37 U/L (ref 1–33)
ANION GAP SERPL CALCULATED.3IONS-SCNC: 9.7 MMOL/L (ref 5–15)
AST SERPL-CCNC: 21 U/L (ref 1–32)
BASOPHILS # BLD AUTO: 0.04 10*3/MM3 (ref 0–0.2)
BASOPHILS NFR BLD AUTO: 0.7 % (ref 0–1.5)
BILIRUB SERPL-MCNC: 0.3 MG/DL (ref 0–1.2)
BUN SERPL-MCNC: 13 MG/DL (ref 8–23)
BUN/CREAT SERPL: 15.7 (ref 7–25)
CALCIUM SPEC-SCNC: 9.8 MG/DL (ref 8.6–10.5)
CHLORIDE SERPL-SCNC: 104 MMOL/L (ref 98–107)
CHOLEST SERPL-MCNC: 217 MG/DL (ref 0–200)
CO2 SERPL-SCNC: 27.3 MMOL/L (ref 22–29)
CREAT SERPL-MCNC: 0.83 MG/DL (ref 0.57–1)
DEPRECATED RDW RBC AUTO: 42 FL (ref 37–54)
EGFRCR SERPLBLD CKD-EPI 2021: 79.3 ML/MIN/1.73
EOSINOPHIL # BLD AUTO: 0.28 10*3/MM3 (ref 0–0.4)
EOSINOPHIL NFR BLD AUTO: 4.6 % (ref 0.3–6.2)
ERYTHROCYTE [DISTWIDTH] IN BLOOD BY AUTOMATED COUNT: 12.5 % (ref 12.3–15.4)
GLOBULIN UR ELPH-MCNC: 2.2 GM/DL
GLUCOSE SERPL-MCNC: 109 MG/DL (ref 65–99)
HBA1C MFR BLD: 5.6 % (ref 4.8–5.6)
HCT VFR BLD AUTO: 40.3 % (ref 34–46.6)
HDLC SERPL-MCNC: 59 MG/DL (ref 40–60)
HGB BLD-MCNC: 13.4 G/DL (ref 12–15.9)
IMM GRANULOCYTES # BLD AUTO: 0.03 10*3/MM3 (ref 0–0.05)
IMM GRANULOCYTES NFR BLD AUTO: 0.5 % (ref 0–0.5)
LDLC SERPL CALC-MCNC: 135 MG/DL (ref 0–100)
LDLC/HDLC SERPL: 2.25 {RATIO}
LYMPHOCYTES # BLD AUTO: 2.18 10*3/MM3 (ref 0.7–3.1)
LYMPHOCYTES NFR BLD AUTO: 36 % (ref 19.6–45.3)
MCH RBC QN AUTO: 30.7 PG (ref 26.6–33)
MCHC RBC AUTO-ENTMCNC: 33.3 G/DL (ref 31.5–35.7)
MCV RBC AUTO: 92.2 FL (ref 79–97)
MONOCYTES # BLD AUTO: 0.5 10*3/MM3 (ref 0.1–0.9)
MONOCYTES NFR BLD AUTO: 8.3 % (ref 5–12)
NEUTROPHILS NFR BLD AUTO: 3.02 10*3/MM3 (ref 1.7–7)
NEUTROPHILS NFR BLD AUTO: 49.9 % (ref 42.7–76)
NRBC BLD AUTO-RTO: 0 /100 WBC (ref 0–0.2)
PLATELET # BLD AUTO: 263 10*3/MM3 (ref 140–450)
PMV BLD AUTO: 10.5 FL (ref 6–12)
POTASSIUM SERPL-SCNC: 3.9 MMOL/L (ref 3.5–5.2)
PROT SERPL-MCNC: 6.2 G/DL (ref 6–8.5)
QT INTERVAL: 432 MS
RBC # BLD AUTO: 4.37 10*6/MM3 (ref 3.77–5.28)
SODIUM SERPL-SCNC: 141 MMOL/L (ref 136–145)
TRIGL SERPL-MCNC: 127 MG/DL (ref 0–150)
TROPONIN T SERPL-MCNC: <0.01 NG/ML (ref 0–0.03)
VLDLC SERPL-MCNC: 23 MG/DL (ref 5–40)
WBC NRBC COR # BLD: 6.05 10*3/MM3 (ref 3.4–10.8)

## 2022-12-13 PROCEDURE — 84484 ASSAY OF TROPONIN QUANT: CPT | Performed by: PHYSICIAN ASSISTANT

## 2022-12-13 PROCEDURE — 70551 MRI BRAIN STEM W/O DYE: CPT

## 2022-12-13 PROCEDURE — 99204 OFFICE O/P NEW MOD 45 MIN: CPT | Performed by: PSYCHIATRY & NEUROLOGY

## 2022-12-13 PROCEDURE — G0378 HOSPITAL OBSERVATION PER HR: HCPCS

## 2022-12-13 PROCEDURE — 70498 CT ANGIOGRAPHY NECK: CPT

## 2022-12-13 PROCEDURE — 83036 HEMOGLOBIN GLYCOSYLATED A1C: CPT | Performed by: PHYSICIAN ASSISTANT

## 2022-12-13 PROCEDURE — 0 IOPAMIDOL PER 1 ML: Performed by: EMERGENCY MEDICINE

## 2022-12-13 PROCEDURE — 80053 COMPREHEN METABOLIC PANEL: CPT | Performed by: PHYSICIAN ASSISTANT

## 2022-12-13 PROCEDURE — 36415 COLL VENOUS BLD VENIPUNCTURE: CPT

## 2022-12-13 PROCEDURE — 80061 LIPID PANEL: CPT | Performed by: PHYSICIAN ASSISTANT

## 2022-12-13 PROCEDURE — 70496 CT ANGIOGRAPHY HEAD: CPT

## 2022-12-13 PROCEDURE — 93005 ELECTROCARDIOGRAM TRACING: CPT | Performed by: PHYSICIAN ASSISTANT

## 2022-12-13 PROCEDURE — 99284 EMERGENCY DEPT VISIT MOD MDM: CPT

## 2022-12-13 PROCEDURE — 93010 ELECTROCARDIOGRAM REPORT: CPT | Performed by: STUDENT IN AN ORGANIZED HEALTH CARE EDUCATION/TRAINING PROGRAM

## 2022-12-13 PROCEDURE — 85025 COMPLETE CBC W/AUTO DIFF WBC: CPT | Performed by: PHYSICIAN ASSISTANT

## 2022-12-13 RX ORDER — LEVOTHYROXINE SODIUM 0.05 MG/1
25 TABLET ORAL
Status: DISCONTINUED | OUTPATIENT
Start: 2022-12-14 | End: 2022-12-13 | Stop reason: HOSPADM

## 2022-12-13 RX ORDER — GABAPENTIN 300 MG/1
300 CAPSULE ORAL 4 TIMES DAILY
Status: DISCONTINUED | OUTPATIENT
Start: 2022-12-13 | End: 2022-12-13 | Stop reason: HOSPADM

## 2022-12-13 RX ORDER — CHOLECALCIFEROL (VITAMIN D3) 125 MCG
5 CAPSULE ORAL NIGHTLY PRN
Status: DISCONTINUED | OUTPATIENT
Start: 2022-12-13 | End: 2022-12-13 | Stop reason: HOSPADM

## 2022-12-13 RX ORDER — ACETAMINOPHEN 325 MG/1
650 TABLET ORAL EVERY 4 HOURS PRN
Status: DISCONTINUED | OUTPATIENT
Start: 2022-12-13 | End: 2022-12-13 | Stop reason: HOSPADM

## 2022-12-13 RX ORDER — SODIUM CHLORIDE 0.9 % (FLUSH) 0.9 %
10 SYRINGE (ML) INJECTION AS NEEDED
Status: DISCONTINUED | OUTPATIENT
Start: 2022-12-13 | End: 2022-12-13 | Stop reason: HOSPADM

## 2022-12-13 RX ORDER — ONDANSETRON 4 MG/1
4 TABLET, FILM COATED ORAL EVERY 6 HOURS PRN
Status: DISCONTINUED | OUTPATIENT
Start: 2022-12-13 | End: 2022-12-13 | Stop reason: HOSPADM

## 2022-12-13 RX ORDER — PHENOL 1.4 %
600 AEROSOL, SPRAY (ML) MUCOUS MEMBRANE 2 TIMES DAILY WITH MEALS
COMMUNITY

## 2022-12-13 RX ORDER — SODIUM CHLORIDE 9 MG/ML
40 INJECTION, SOLUTION INTRAVENOUS AS NEEDED
Status: DISCONTINUED | OUTPATIENT
Start: 2022-12-13 | End: 2022-12-13 | Stop reason: HOSPADM

## 2022-12-13 RX ORDER — SODIUM CHLORIDE 0.9 % (FLUSH) 0.9 %
10 SYRINGE (ML) INJECTION EVERY 12 HOURS SCHEDULED
Status: DISCONTINUED | OUTPATIENT
Start: 2022-12-13 | End: 2022-12-13 | Stop reason: HOSPADM

## 2022-12-13 RX ORDER — ASPIRIN 81 MG/1
81 TABLET, CHEWABLE ORAL DAILY
COMMUNITY

## 2022-12-13 RX ORDER — CHOLECALCIFEROL (VITAMIN D3) 50 MCG
2000 TABLET ORAL DAILY
COMMUNITY

## 2022-12-13 RX ORDER — ASPIRIN 81 MG/1
81 TABLET, CHEWABLE ORAL DAILY
Status: DISCONTINUED | OUTPATIENT
Start: 2022-12-13 | End: 2022-12-13

## 2022-12-13 RX ORDER — NITROGLYCERIN 0.4 MG/1
0.4 TABLET SUBLINGUAL
Status: DISCONTINUED | OUTPATIENT
Start: 2022-12-13 | End: 2022-12-13 | Stop reason: HOSPADM

## 2022-12-13 RX ORDER — ASPIRIN 81 MG/1
81 TABLET, CHEWABLE ORAL DAILY
Status: DISCONTINUED | OUTPATIENT
Start: 2022-12-14 | End: 2022-12-13 | Stop reason: HOSPADM

## 2022-12-13 RX ORDER — ATORVASTATIN CALCIUM 80 MG/1
80 TABLET, FILM COATED ORAL NIGHTLY
Status: DISCONTINUED | OUTPATIENT
Start: 2022-12-13 | End: 2022-12-13 | Stop reason: HOSPADM

## 2022-12-13 RX ORDER — ROPINIROLE 0.5 MG/1
0.25 TABLET, FILM COATED ORAL NIGHTLY
Status: DISCONTINUED | OUTPATIENT
Start: 2022-12-13 | End: 2022-12-13 | Stop reason: HOSPADM

## 2022-12-13 RX ORDER — ONDANSETRON 2 MG/ML
4 INJECTION INTRAMUSCULAR; INTRAVENOUS EVERY 6 HOURS PRN
Status: DISCONTINUED | OUTPATIENT
Start: 2022-12-13 | End: 2022-12-13 | Stop reason: HOSPADM

## 2022-12-13 RX ADMIN — Medication 10 ML: at 11:34

## 2022-12-13 RX ADMIN — IOPAMIDOL 100 ML: 755 INJECTION, SOLUTION INTRAVENOUS at 09:23

## 2022-12-13 NOTE — PROGRESS NOTES
Clinical Pharmacy Services: Medication History    Cailin Garcia is a 62 y.o. female presenting to [facility] for   Chief Complaint   Patient presents with   • Neurologic Problem       She  has a past medical history of Chronic pain disorder (Nov 1, 2021), DDD (degenerative disc disease), cervical (09/07/2022), Extremity pain (Nov 1, 2021), H/O bone density study (NEVER), H/O complete eye exam (DUE), Hypothyroidism, Neck pain (Nov 1, 2021), and TIA (transient ischemic attack) (12/13/2022).    Allergies as of 12/13/2022 - Reviewed 12/13/2022   Allergen Reaction Noted   • Sulfa antibiotics Hives 10/12/2017       Medication information was obtained from: Patient   Pharmacy and Phone Number:     Prior to Admission Medications     Prescriptions Last Dose Informant Patient Reported? Taking?    aspirin 81 MG chewable tablet 12/12/2022 Self Yes Yes    Chew 81 mg Daily.    calcium carbonate (OS-WADE) 600 MG tablet 12/12/2022 Self Yes Yes    Take 600 mg by mouth 2 (Two) Times a Day With Meals.    Cholecalciferol (Vitamin D) 50 MCG (2000 UT) tablet 12/12/2022 Self Yes Yes    Take 2,000 Units by mouth Daily.    folic acid (FOLVITE) 1 MG tablet 12/12/2022 Self No Yes    Take 1 tablet by mouth Daily.    gabapentin (NEURONTIN) 300 MG capsule 12/12/2022 Self No Yes    Take 1 capsule by mouth 4 (Four) Times a Day.    levothyroxine (Synthroid) 25 MCG tablet 12/13/2022 Self No Yes    Take 1 tablet by mouth Daily.    rOPINIRole (Requip) 0.25 MG tablet 12/12/2022 Self No Yes    Take 1 tablet by mouth Every Night. Take 1 hour before bedtime.    vitamin B-12 (CYANOCOBALAMIN) 1000 MCG tablet 12/12/2022  Yes Yes    Take 1,000 mcg by mouth Daily.            Medication notes:     This medication list is complete to the best of my knowledge as of 12/13/2022    Please call if questions.    Yohan Cifuentes  Medication History Technician  [phone]    12/13/2022 10:28 EST

## 2022-12-13 NOTE — PLAN OF CARE
Goal Outcome Evaluation:              Outcome Evaluation: patient left observatioin unit at this time with all her belongings discharge summary provided and education included jarod is aware to follow up with primary care and with neuro as needed, patient had no further questions prior to discharge.

## 2022-12-13 NOTE — ED PROVIDER NOTES
EMERGENCY DEPARTMENT ENCOUNTER    Room Number:  121/1  Date of encounter:  12/13/2022  PCP: Dallas King MD  Historian: Patient, spouse      I used full protective equipment while examining this patient.  This includes face mask, gloves and protective eyewear.  I washed my hands before entering the room and immediately upon leaving the room      HPI:  Chief Complaint: Facial numbness, expressive aphasia, visual deficits  A complete HPI/ROS/PMH/PSH/SH/FH are unobtainable due to: Nothing    Context: Cailin Garcia is a 62 y.o. female who presents to the ED c/o episodes of visual field deficits, expressive aphasia, facial numbness.  Patient states her symptoms for started 2 weeks ago when she noticed an approximate 10-minute episode of visual field loss on the lateral side of her left eye.  She states several days later she had a similar episode in the right lateral field of vision.  Patient followed up with an ophthalmologist shortly thereafter and was told her eyes were fine.  Then 8 days ago patient had an episode of expressive aphasia that lasted approximately 5 minutes.  She was on the phone with one of her friends and could not get words out.  This morning patient had an episode after she woke up that her left side of her face felt numb.  This episode to lasted approximately 5 minutes.  At this time she denies any significant plaints.  She denies any unilateral weakness, visual field deficits, dizziness, dysarthria.  She denies any previous history of any stroke or TIA work-up.  She had been in contact with her primary care physician who is ordering carotid ultrasounds and MRIs.  After her episode this morning she was encouraged to go to the ER for further evaluation.    Review of Medical Records  I reviewed patient's family medicine office visit from 12/7/2022.  Patient being followed for restless leg syndrome, neuropathy.    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   • Hypothyroidism 11/28/2018   • Hair loss 04/19/2021   • Screen for colon cancer 05/04/2021   • DDD (degenerative disc disease), cervical 09/07/2022   • B12 deficiency 09/08/2022   • RLS (restless legs syndrome) 12/06/2022   • Folate deficiency 12/06/2022     Resolved Ambulatory Problems     Diagnosis Date Noted   • No Resolved Ambulatory Problems     Past Medical History:   Diagnosis Date   • Chronic pain disorder Nov 1, 2021   • Extremity pain Nov 1, 2021   • H/O bone density study NEVER   • H/O complete eye exam DUE   • Neck pain Nov 1, 2021   • TIA (transient ischemic attack) 12/13/2022         PAST SURGICAL HISTORY  Past Surgical History:   Procedure Laterality Date   • CHOLECYSTECTOMY  2002   • COLONOSCOPY N/A 12/13/2021    Procedure: COLONOSCOPY to CECUM;  Surgeon: Brianne Lin MD;  Location: [facility] ENDOSCOPY;  Service: General;  Laterality: N/A;  SCREENING  ---NORMAL   • EPIDURAL BLOCK  11/15/95 & 2/7/97    birth of boys   • KIDNEY STONE SURGERY     • LASIK  2000   • MAMMO ABORTED BREAST BIOPSY UNILATERAL  06/2017   • SUBTOTAL HYSTERECTOMY      2007         FAMILY HISTORY  History reviewed. No pertinent family history.      SOCIAL HISTORY  Social History     Socioeconomic History   • Marital status:    Tobacco Use   • Smoking status: Never   • Smokeless tobacco: Never   Substance and Sexual Activity   • Alcohol use: Yes     Alcohol/week: 5.0 standard drinks     Types: 3 Glasses of wine, 2 Drinks containing 0.5 oz of alcohol per week     Comment: Not consistent   • Drug use: Never   • Sexual activity: Yes     Partners: Male     Birth control/protection: Vasectomy     Comment: hysterectomy and old         ALLERGIES  Sulfa antibiotics        REVIEW OF SYSTEMS  All systems reviewed and negative except for those discussed in HPI.       PHYSICAL EXAM    I have reviewed the triage vital signs and nursing notes.    ED Triage Vitals [12/13/22 0605]   Temp Heart  Rate Resp BP SpO2   97.3 °F (36.3 °C) 96 16 159/88 99 %      Temp src Heart Rate Source Patient Position BP Location FiO2 (%)   Tympanic Monitor Sitting Right arm --       Physical Exam  GENERAL: Alert, oriented, not distressed  HENT: head atraumatic, no nuchal rigidity  EYES: no scleral icterus, EOMI  CV: regular rhythm, regular rate, no murmur  RESPIRATORY: normal effort, CTA  ABDOMEN: soft, nontender  MUSCULOSKELETAL: no deformity, FROM, no calf swelling or tenderness  NEURO: alert, 5/5 strength in all extremities, no facial paralysis.  NIH stroke scale: 0  SKIN: warm, dry        LAB RESULTS  Recent Results (from the past 24 hour(s))   Comprehensive Metabolic Panel    Collection Time: 12/13/22  8:03 AM    Specimen: Blood   Result Value Ref Range    Glucose 109 (H) 65 - 99 mg/dL    BUN 13 8 - 23 mg/dL    Creatinine 0.83 0.57 - 1.00 mg/dL    Sodium 141 136 - 145 mmol/L    Potassium 3.9 3.5 - 5.2 mmol/L    Chloride 104 98 - 107 mmol/L    CO2 27.3 22.0 - 29.0 mmol/L    Calcium 9.8 8.6 - 10.5 mg/dL    Total Protein 6.2 6.0 - 8.5 g/dL    Albumin 4.00 3.50 - 5.20 g/dL    ALT (SGPT) 37 (H) 1 - 33 U/L    AST (SGOT) 21 1 - 32 U/L    Alkaline Phosphatase 96 39 - 117 U/L    Total Bilirubin 0.3 0.0 - 1.2 mg/dL    Globulin 2.2 gm/dL    A/G Ratio 1.8 g/dL    BUN/Creatinine Ratio 15.7 7.0 - 25.0    Anion Gap 9.7 5.0 - 15.0 mmol/L    eGFR 79.3 >60.0 mL/min/1.73   Troponin    Collection Time: 12/13/22  8:03 AM    Specimen: Blood   Result Value Ref Range    Troponin T <0.010 0.000 - 0.030 ng/mL   CBC Auto Differential    Collection Time: 12/13/22  8:03 AM    Specimen: Blood   Result Value Ref Range    WBC 6.05 3.40 - 10.80 10*3/mm3    RBC 4.37 3.77 - 5.28 10*6/mm3    Hemoglobin 13.4 12.0 - 15.9 g/dL    Hematocrit 40.3 34.0 - 46.6 %    MCV 92.2 79.0 - 97.0 fL    MCH 30.7 26.6 - 33.0 pg    MCHC 33.3 31.5 - 35.7 g/dL    RDW 12.5 12.3 - 15.4 %    RDW-SD 42.0 37.0 - 54.0 fl    MPV 10.5 6.0 - 12.0 fL    Platelets 263 140 - 450 10*3/mm3     Neutrophil % 49.9 42.7 - 76.0 %    Lymphocyte % 36.0 19.6 - 45.3 %    Monocyte % 8.3 5.0 - 12.0 %    Eosinophil % 4.6 0.3 - 6.2 %    Basophil % 0.7 0.0 - 1.5 %    Immature Grans % 0.5 0.0 - 0.5 %    Neutrophils, Absolute 3.02 1.70 - 7.00 10*3/mm3    Lymphocytes, Absolute 2.18 0.70 - 3.10 10*3/mm3    Monocytes, Absolute 0.50 0.10 - 0.90 10*3/mm3    Eosinophils, Absolute 0.28 0.00 - 0.40 10*3/mm3    Basophils, Absolute 0.04 0.00 - 0.20 10*3/mm3    Immature Grans, Absolute 0.03 0.00 - 0.05 10*3/mm3    nRBC 0.0 0.0 - 0.2 /100 WBC   Hemoglobin A1c    Collection Time: 12/13/22  8:03 AM    Specimen: Blood   Result Value Ref Range    Hemoglobin A1C 5.60 4.80 - 5.60 %   Lipid Panel    Collection Time: 12/13/22  8:03 AM    Specimen: Blood   Result Value Ref Range    Total Cholesterol 217 (H) 0 - 200 mg/dL    Triglycerides 127 0 - 150 mg/dL    HDL Cholesterol 59 40 - 60 mg/dL    LDL Cholesterol  135 (H) 0 - 100 mg/dL    VLDL Cholesterol 23 5 - 40 mg/dL    LDL/HDL Ratio 2.25    ECG 12 Lead Stroke Evaluation    Collection Time: 12/13/22  8:06 AM   Result Value Ref Range    QT Interval 432 ms       Ordered the above labs and independently reviewed the results.        RADIOLOGY  CT Angiogram Neck, CT Angiogram Head    Result Date: 12/13/2022  CONTRAST-ENHANCED CT ANGIOGRAM OF THE HEAD AND NECK ON 12/13/2022  CLINICAL HISTORY: Patient has intermittent expressive aphasia, visual field defects and facial numbness.  TECHNIQUE: Spiral CT images were obtained from the base of the skull to the vertex both pre and postintravenous contrast and these images were reformatted and submitted in 3 mm thick axial, sagittal and coronal CT sections with brain algorithm and additional spiral CT angiogram images were obtained from the top of the aortic arch up through the great vessels of the head and neck during arterial phase of contrast and images were reformatted and submitted in 1 mm thick axial, sagittal and coronal CT sections with soft  tissue algorithm and 3-D reconstructions were performed to complete the CT angiogram of the head and neck.  COMPARISON: There are no prior studies from [facility] for comparison.  FINDINGS: There is very minimal low-density in the frontal periventricular white matter consistent with very minimal small vessel disease. The remainder of the brain parenchyma is normal in attenuation. The ventricles are normal in size. I see no focal mass effect. There is no midline shift. No extra-axial fluid collections are identified. No abnormal areas of enhancement are seen in the head. The calvarium and the skull base are normal in appearance. There is minimal inferomedial right frontal sinus and frontal recess and anterior right ethmoid sinus mucosal thickening. The remainder of the paranasal sinuses and mastoid air cells and middle ear cavities are clear.  CT ANGIOGRAM OF THE NECK: The nasopharynx, oropharynx, the hypopharynx, the true cords and subglottic airway are normal in appearance. The thyroid gland enhances homogeneously and is normal in appearance. The lung apices are clear. The parotid,  parapharyngeal and submandibular spaces are symmetric and are normal in appearance. Mild cervical spondylosis is present with 1 mm anterolisthesis of C3 on C4 and C4 on C5 and there is mild disc space narrowing and degenerative endplate changes of C5-6 with mild posterior endplate spurring and uncovertebral joint hypertrophy contributing to mild canal and there is mild up to mild-to-moderate left bony foraminal narrowing at C5-6, otherwise the cervical spine is unremarkable. There is anatomic origin of the great vessels off the aortic arch. The left subclavian artery origin is normal in appearance and no stenosis is seen in the left subclavian artery. The left vertebral artery origin is normal in appearance and no stenosis is seen in the left vertebral artery from its origin to the vertebrobasilar junction. The  left common carotid origin is normal in appearance and no stenosis is seen in the left common carotid artery and its bifurcation into the left internal and external carotid arteries is normal in appearance and no stenosis is seen in the cervical segment of the left internal carotid artery using the NASCET criteria. The brachiocephalic artery origin is normal in appearance and no stenosis is seen in the brachiocephalic artery and its bifurcation into the right subclavian and common carotid arteries is normal in appearance and no stenosis is seen in the right subclavian artery. The right vertebral artery origin is normal in appearance and no stenosis is seen in the right vertebral artery from its origin to the vertebrobasilar junction. The right common carotid origin is normal in appearance and no stenosis seen in the right common carotid artery and its bifurcation into the right internal and external carotid arteries is normal in appearance and no stenosis is seen in the cervical segment of the right internal carotid artery using the NASCET criteria.  CT ANGIOGRAM OF THE HEAD: The intracranial segments of distal vertebral arteries are widely patent to the vertebrobasilar junction without stenosis. The basilar artery and the basilar tip is normal in appearance. There is an atretic P1 segment of the left posterior cerebral artery with persistent fetal origin of left posterior cerebral artery off the backwall of the supracavernous left internal carotid artery which is a normal anatomic variation and both the right and left posterior cerebral and superior cerebellar arteries are within normal limits. The upper cervical, petrous, cavernous and supracavernous segments of the internal carotid arteries are within normal limits. The ophthalmic artery origins are within normal limits. The A1 segments of the anterior cerebral arteries and the anterior communicating artery origin A2 branches of the anterior cerebral arteries are  normal in appearance bilaterally. The M1 segments of middle cerebral arteries and middle cerebral artery bifurcations are within normal limits. The visualized M2 branches of middle cerebral arteries appear widely patent [ ] fissures.      CT of the head demonstrates very minimal small vessel disease in the frontal periventricular white matter, minimal mucosal thickening medial right frontal sinus, right frontal recess and anterior right ethmoid sinus. Otherwise the head CT is within normal limits with no acute completed infarct or intracranial hemorrhage identified. CT angiogram of the head and neck is within normal limits with no stenosis seen in the great vessels of the neck, in particular no vertebral artery stenosis is seen and no stenosis is seen in the cervical segments of the internal carotid arteries using the NASCET criteria and no intracranial vascular stenoses or occlusions are identified and the etiology of this patient's intermittent expressive aphasia and visual field defect and facial numbness is not established on this exam. If there remains any clinical suspicion of an acute infarct I recommend an MRI of the brain for more complete assessment. The results and recommendation were communicated to Weston Hi, the Physician's Assistant in the ER taking care of the patient, by telephone on 12/13/2022 at 10:00 AM.  Radiation dose reduction techniques were utilized, including automated exposure control and exposure modulation based on body size.       MRI Brain Without Contrast    Result Date: 12/13/2022  MRI EXAMINATION OF BRAIN WITHOUT CONTRAST  HISTORY: TIA.  COMPARISON: CT angiogram neck and head 12/13/2022. No prior MRIs available for comparison.  TECHNIQUE: A MRI examination of the brain was performed utilizing sagittal T1, axial diffusion, T1, T2, T2 FLAIR and gradient echo T2 imaging.  FINDINGS: There is no evidence of restricted diffusion, hydrocephalus or of abnormal extra-axial fluid. There is  expected flow-void in the basilar artery and in the distal aspect of the internal carotid arteries bilaterally on the axial T2 sequence. There is a trace amount of fluid in the mastoid air cells on the right and a small volume of fluid in the mastoid air cells on the left. Mild-to-moderate mucosal thickening involving the ethmoid air cells on the right anteriorly is noted. There is no evidence of an intracranial mass or mass effect on this noncontrasted MRI examination of the brain. Mild small vessel ischemic disease is noted.      There is no evidence of acute infarction. Mild small vessel ischemic disease is noted. See above.        I ordered the above noted radiological studies. Reviewed by me and discussed with radiologist.  See dictation for official radiology interpretation.      MEDICATIONS GIVEN IN ER    Medications   sodium chloride 0.9 % flush 10 mL (has no administration in time range)   nitroglycerin (NITROSTAT) SL tablet 0.4 mg (has no administration in time range)   sodium chloride 0.9 % flush 10 mL (10 mL Intravenous Given 12/13/22 1134)   sodium chloride 0.9 % flush 10 mL (has no administration in time range)   sodium chloride 0.9 % infusion 40 mL (has no administration in time range)   ondansetron (ZOFRAN) tablet 4 mg (has no administration in time range)     Or   ondansetron (ZOFRAN) injection 4 mg (has no administration in time range)   acetaminophen (TYLENOL) tablet 650 mg (has no administration in time range)   melatonin tablet 5 mg (has no administration in time range)   gabapentin (NEURONTIN) capsule 300 mg (has no administration in time range)   levothyroxine (SYNTHROID, LEVOTHROID) tablet 25 mcg (has no administration in time range)   rOPINIRole (REQUIP) tablet 0.25 mg (has no administration in time range)   atorvastatin (LIPITOR) tablet 80 mg (has no administration in time range)   aspirin chewable tablet 81 mg (has no administration in time range)   iopamidol (ISOVUE-370) 76 % injection  100 mL (100 mL Intravenous Given by Other 12/13/22 0923)         PROGRESS, DATA ANALYSIS, CONSULTS, AND MEDICAL DECISION MAKING    All labs have been independently reviewed by me.  All radiology studies have been reviewed by me and discussed with radiologist dictating the report.   EKG's independently viewed and interpreted by me.  Discussion below represents my analysis of pertinent findings related to patient's condition, differential diagnosis, treatment plan and final disposition.    I have discussed case with Dr. Todd, emergency room physician.  He has performed his own bedside examination and agrees with treatment plan.    ED Course as of 12/13/22 1535   Tue Dec 13, 2022   0751 Patient presents with intermittent symptoms of expressive aphasia, visual field deficits, facial numbness.  No neurodeficits at this time.  Differential diagnoses include but not limited to TIA, multiple sclerosis, migraine.  Plan for basic labs and likely admission for TIA work-up. [EE]   0958 WBC: 6.05 [EE]   0958 Troponin T: <0.010 [EE]   0958 Creatinine: 0.83 [EE]   0958 EKG interpreted myself.  Time 0806.  Sinus rhythm, rate 66.  Normal P/FEDERICO.  QRS normal with normal axis.  No significant ST abnormalities.  No previous for comparison. [EE]   0959 I discussed the case with Aleta Gentile, physician assistant in the observation unit.  She agrees to admit the patient. [EE]   1003 I discussed head and neck CTAs with Dr. Hartman.  No acute abnormalities.  He recommends MRI for further evaluation. [EE]      ED Course User Index  [EE] Weston Hi PA       AS OF 15:35 EST VITALS:    BP - 142/75  HR - 72  TEMP - 97.8 °F (36.6 °C) (Oral)  O2 SATS - 100%        DIAGNOSIS  Final diagnoses:   Expressive aphasia   Facial paresthesia   Visual field defect         DISPOSITION  Admitted      Dictated utilizing Dragon dictation     Weston Hi PA  12/13/22 7023

## 2022-12-13 NOTE — ED NOTES
"Nursing report ED to floor  Cailin Garcia  62 y.o.  female    HPI :   Chief Complaint   Patient presents with    Neurologic Problem       Admitting doctor:   Mendel Do MD    Admitting diagnosis:   There were no encounter diagnoses.    Code status:   Current Code Status       Date Active Code Status Order ID Comments User Context       12/13/2022 0953 CPR (Attempt to Resuscitate) [id number]  Aleta Gentile PA ED        Question Answer    Code Status (Patient has no pulse and is not breathing) CPR (Attempt to Resuscitate)    Medical Interventions (Patient has pulse or is breathing) Full Support    Level Of Support Discussed With Patient                    Allergies:   Sulfa antibiotics    Isolation:   No active isolations    Intake and Output  No intake or output data in the 24 hours ending 12/13/22 0955    Weight:       12/13/22 0605   Weight: 68 kg (150 lb)       Most recent vitals:   Vitals:    12/13/22 0605 12/13/22 0801 12/13/22 0901   BP: 159/88 138/74 137/67   BP Location: Right arm     Patient Position: Sitting     Pulse: 96 70 69   Resp: 16     Temp: 97.3 °F (36.3 °C)     TempSrc: Tympanic     SpO2: 99% 99% 97%   Weight: 68 kg (150 lb)     Height: 165.1 cm (65\")         Active LDAs/IV Access:   Lines, Drains & Airways       Active LDAs       Name Placement date Placement time Site Days    Peripheral IV 12/13/22 0710 Left Antecubital 12/13/22 0710  Antecubital  less than 1                    Labs (abnormal labs have a star):   Labs Reviewed   COMPREHENSIVE METABOLIC PANEL - Abnormal; Notable for the following components:       Result Value    Glucose 109 (*)     ALT (SGPT) 37 (*)     All other components within normal limits    Narrative:     GFR Normal >60  Chronic Kidney Disease <60  Kidney Failure <15     TROPONIN (IN-HOUSE) - Normal    Narrative:     Troponin T Reference Range:  <= 0.03 ng/mL-   Negative for AMI  >0.03 ng/mL-     Abnormal for myocardial necrosis.  Clinicians would have to " utilize clinical acumen, EKG, Troponin and serial changes to determine if it is an Acute Myocardial Infarction or myocardial injury due to an underlying chronic condition.       Results may be falsely decreased if patient taking Biotin.     CBC WITH AUTO DIFFERENTIAL - Normal   HEMOGLOBIN A1C   LIPID PANEL   CBC AND DIFFERENTIAL    Narrative:     The following orders were created for panel order CBC & Differential.  Procedure                               Abnormality         Status                     ---------                               -----------         ------                     CBC Auto Differential[[id number]]        Normal              Final result                 Please view results for these tests on the individual orders.       EKG:   ECG 12 Lead Stroke Evaluation   Preliminary Result   HEART RATE= 66  bpm   RR Interval= 909  ms   AK Interval= 159  ms   P Horizontal Axis= 47  deg   P Front Axis= 39  deg   QRSD Interval= 92  ms   QT Interval= 432  ms   QRS Axis= 38  deg   T Wave Axis= 58  deg   - BORDERLINE ECG -   Sinus rhythm   Borderline repolarization abnormality   Baseline wander in lead(s) I,III,aVL   Electronically Signed By:    Date and Time of Study: 2022-12-13 08:06:01          Meds given in ED:   Medications   sodium chloride 0.9 % flush 10 mL (has no administration in time range)   nitroglycerin (NITROSTAT) SL tablet 0.4 mg (has no administration in time range)   sodium chloride 0.9 % flush 10 mL (has no administration in time range)   sodium chloride 0.9 % flush 10 mL (has no administration in time range)   sodium chloride 0.9 % infusion 40 mL (has no administration in time range)   ondansetron (ZOFRAN) tablet 4 mg (has no administration in time range)     Or   ondansetron (ZOFRAN) injection 4 mg (has no administration in time range)   acetaminophen (TYLENOL) tablet 650 mg (has no administration in time range)   melatonin tablet 5 mg (has no administration in time range)   iopamidol  (ISOVUE-370) 76 % injection 100 mL (100 mL Intravenous Given by Other 12/13/22 0943)       Imaging results:  No radiology results for the last day    Ambulatory status:   - ad radha     Social issues:   Social History     Socioeconomic History    Marital status:    Tobacco Use    Smoking status: Never    Smokeless tobacco: Never   Substance and Sexual Activity    Alcohol use: Yes     Alcohol/week: 5.0 standard drinks     Types: 3 Glasses of wine, 2 Drinks containing 0.5 oz of alcohol per week     Comment: Not consistent    Drug use: Never    Sexual activity: Yes     Partners: Male     Birth control/protection: Vasectomy     Comment: hysterectomy and old       NIH Stroke Scale:  Interval: baseline      Maddison Epps RN  12/13/22 09:55 EST

## 2022-12-13 NOTE — H&P
[facility]   HISTORY AND PHYSICAL    Patient Name: Cailin Garcia  : 1960  MRN: [id number]  Primary Care Physician:  Dallas King MD  Date of admission: 2022    Subjective   Subjective     Chief Complaint:   Chief Complaint   Patient presents with   • Neurologic Problem         HPI:    Cailin Garcia is a 62 y.o. female with history of hypothyroidism who presented to the emergency department today complaining of expressive aphasia and visual disturbances approximately 3 weeks ago.  Patient states the weekend before Thanksgi she noticed her left peripheral vision was blurry, this resolved on its own.  The next day, patient states in her right peripheral vision she saw waves.  Shortly after, she tried to make a phone call and had expressive aphasia.  Her symptoms resolved and she followed up with her ophthalmologist.  Her eye exam was normal and ophthalmologist ordered an outpatient MRI.  Patient then followed up with her PCP who wanted additional imaging.  This morning, when patient woke up she felt like the left side of her face was numb and tingly.  Therefore, she presented to the emergency department.  Patient denies unilateral weakness, gait disturbance, dizziness, facial droop, slurred speech.  Patient denies chest pain, shortness of breath, palpitations, nausea, vomiting, diarrhea, fever, chills, recent sick contacts.    ED evaluation reviewed, patient has CTA head and neck which demonstrated very minimal small vessel disease in the frontal periventricular white matter, minimal mucosal thickening in her sinuses, otherwise negative acute.  Laboratory evaluation largely within normal limits.    Review of Systems   All systems were reviewed and negative except for: What is discussed in the HPI    Personal History     Past Medical History:   Diagnosis Date   • Chronic pain disorder 2021    wake up with burning hands and feet   • DDD (degenerative disc disease), cervical 2022    • Extremity pain Nov 1, 2021    ache in big toes   • H/O bone density study NEVER   • H/O complete eye exam DUE   • Hypothyroidism    • Neck pain Nov 1, 2021    wake up with base of skull and neck pain   • TIA (transient ischemic attack) 12/13/2022       Past Surgical History:   Procedure Laterality Date   • CHOLECYSTECTOMY  2002   • COLONOSCOPY N/A 12/13/2021    Procedure: COLONOSCOPY to CECUM;  Surgeon: Brianne Lin MD;  Location: [facility] ENDOSCOPY;  Service: General;  Laterality: N/A;  SCREENING  ---NORMAL   • EPIDURAL BLOCK  11/15/95 & 2/7/97    birth of boys   • KIDNEY STONE SURGERY     • LASIK  2000   • MAMMO ABORTED BREAST BIOPSY UNILATERAL  06/2017   • SUBTOTAL HYSTERECTOMY      2007       Family History: family history is not on file. Otherwise pertinent FHx was reviewed and not pertinent to current issue.    Social History:  reports that she has never smoked. She has never used smokeless tobacco. She reports current alcohol use of about 5.0 standard drinks per week. She reports that she does not use drugs.    Home Medications:  Vitamin D, aspirin, calcium carbonate, folic acid, gabapentin, levothyroxine, rOPINIRole, and vitamin B-12    Allergies:  Allergies   Allergen Reactions   • Sulfa Antibiotics Hives       Objective   Objective     Vitals:   Temp:  [97.3 °F (36.3 °C)-97.8 °F (36.6 °C)] 97.8 °F (36.6 °C)  Heart Rate:  [69-96] 72  Resp:  [16-18] 18  BP: (134-159)/(67-88) 142/75  Physical Exam     GENERAL: 62 y.o. YO female a/o x 4, NAD  SKIN: Warm pink and dry   HEENT:  PERRL, EOM intact, conjunctivae normal, sclerae clear  NECK: supple, no JVD  LUNGS: Clear to auscultation bilaterally without wheezes, rales or rhonchi.  No accessory muscle use and no nasal flaring.  CARDIAC:  Regular rate and rhythm, S1-S2.  No murmurs, rubs or gallops.  No peripheral edema.  Equal pulses bilaterally.  ABDOMEN: Soft, nontender, nondistended.  No guarding or rebound tenderness.  Normal bowel  sounds.  MUSCULOSKELETAL: Moves all extremities well.  No deformity.  NEURO: Cranial nerves II through XII grossly intact.  No gross focal deficits.  Alert.  Normal speech and motor.  PSYCH: Normal mood and affect      Result Review    Result Review:  I have personally reviewed the results from the time of this admission to 12/13/2022 12:42 EST and agree with these findings:  [x]  Laboratory list / accordion  []  Microbiology  [x]  Radiology  []  EKG/Telemetry   []  Cardiology/Vascular   []  Pathology  []  Old records  []  Other:  Most notable findings include:     CT Angiogram Neck    Result Date: 12/13/2022  CT of the head demonstrates very minimal small vessel disease in the frontal periventricular white matter, minimal mucosal thickening medial right frontal sinus, right frontal recess and anterior right ethmoid sinus. Otherwise the head CT is within normal limits with no acute completed infarct or intracranial hemorrhage identified. CT angiogram of the head and neck is within normal limits with no stenosis seen in the great vessels of the neck, in particular no vertebral artery stenosis is seen and no stenosis is seen in the cervical segments of the internal carotid arteries using the NASCET criteria and no intracranial vascular stenoses or occlusions are identified and the etiology of this patient's intermittent expressive aphasia and visual field defect and facial numbness is not established on this exam. If there remains any clinical suspicion of an acute infarct I recommend an MRI of the brain for more complete assessment. The results and recommendation were communicated to Weston Hi, the Physician's Assistant in the ER taking care of the patient, by telephone on 12/13/2022 at 10:00 AM.  Radiation dose reduction techniques were utilized, including automated exposure control and exposure modulation based on body size.       CT Angiogram Head    Result Date: 12/13/2022  CT of the head demonstrates very  minimal small vessel disease in the frontal periventricular white matter, minimal mucosal thickening medial right frontal sinus, right frontal recess and anterior right ethmoid sinus. Otherwise the head CT is within normal limits with no acute completed infarct or intracranial hemorrhage identified. CT angiogram of the head and neck is within normal limits with no stenosis seen in the great vessels of the neck, in particular no vertebral artery stenosis is seen and no stenosis is seen in the cervical segments of the internal carotid arteries using the NASCET criteria and no intracranial vascular stenoses or occlusions are identified and the etiology of this patient's intermittent expressive aphasia and visual field defect and facial numbness is not established on this exam. If there remains any clinical suspicion of an acute infarct I recommend an MRI of the brain for more complete assessment. The results and recommendation were communicated to Weston Hi, the Physician's Assistant in the ER taking care of the patient, by telephone on 12/13/2022 at 10:00 AM.  Radiation dose reduction techniques were utilized, including automated exposure control and exposure modulation based on body size.             Assessment & Plan   Assessment / Plan     Brief Patient Summary:  Cailin Garcia is a 62 y.o. female who is being admitted to the observation unit for further evaluation of visual disturbances and expressive aphasia.  Patient's symptoms occurred approximately 3 weeks ago and have not recurred.  However, today she did have some left-sided facial numbness and tingling.  We will obtain MRI of brain and consult neurology.    Active Hospital Problems:  Active Hospital Problems    Diagnosis    • **TIA (transient ischemic attack)      Plan:     Expressive aphasia  Visual disturbance  Facial paresthesia  -TIA work-up  -CTA head and neck negative acute  -MRI brain pending  -Lipids/hemoglobin A1c  -Aspirin/statin  -Consult  neurology    Hypothyroidism  -Continue home medications as prescribed    DVT prophylaxis:  Mechanical DVT prophylaxis orders are present.    CODE STATUS:    Level Of Support Discussed With: Patient  Code Status (Patient has no pulse and is not breathing): CPR (Attempt to Resuscitate)  Medical Interventions (Patient has pulse or is breathing): Full Support    Admission Status:  I believe this patient meets observation status.    I wore an N95 mask, face shield, and gloves during this patient encounter. Patient also wearing a surgical mask throughout encounter. Hand hygeine performed before and after seeing the patient.    Electronically signed by BRIAN Louis, 12/13/22, 12:42 PM EST.

## 2022-12-13 NOTE — PROGRESS NOTES
ED OBSERVATION PROGRESS/DISCHARGE SUMMARY    Date of Admission: 12/13/2022   LOS: 0 days   PCP: Dallas King MD    Subjective  No acute events, patient reports her symptoms have resolved and have not recurred    Hospital Outcome: 62-year-old female admitted to the observation unit for further evaluation of expressive aphasia and visual disturbance that occurred 3 weeks ago.  Patient had CTA head and neck and MRI brain.  MRI brain shows several cortical and subcortical T2 hyperintensities suggestive of migraine.  Patient was seen by neurology who feel her symptoms were consistent with migraine with aura.  Patient is safe for discharge home and can follow-up with neurology on an as-needed basis.    Usual return to ER precautions discussed with patient who expresses understanding and is in agreement with plan.      ROS:  General: no fevers, chills  Respiratory: no cough, dyspnea  Cardiovascular: no chest pain, palpitations  Abdomen: No abdominal pain, nausea, vomiting, or diarrhea  Neurologic: No focal weakness    Objective   Physical Exam:  I have reviewed the vital signs.  Temp:  [97.3 °F (36.3 °C)-97.8 °F (36.6 °C)] 97.8 °F (36.6 °C)  Heart Rate:  [69-96] 72  Resp:  [16-18] 18  BP: (134-159)/(67-88) 142/75  General Appearance:    Alert, cooperative, no distress  Head:    Normocephalic, atraumatic  Eyes:    Sclerae anicteric  Neck:   Supple, no mass  Lungs: Clear to auscultation bilaterally, respirations unlabored  Heart: Regular rate and rhythm, S1 and S2 normal, no murmur, rub or gallop  Abdomen:  Soft, non-tender, bowel sounds active, nondistended  Extremities: No clubbing, cyanosis, or edema to lower extremities  Pulses:  2+ and symmetric in distal lower extremities  Skin: No rashes   Neurologic: Oriented x3, Normal strength to extremities    Results Review:    I have reviewed the labs, radiology results and diagnostic studies.    Results from last 7 days   Lab Units 12/13/22  0803   WBC 10*3/mm3 6.05    HEMOGLOBIN g/dL 13.4   HEMATOCRIT % 40.3   PLATELETS 10*3/mm3 263     Results from last 7 days   Lab Units 12/13/22  0803   SODIUM mmol/L 141   POTASSIUM mmol/L 3.9   CHLORIDE mmol/L 104   CO2 mmol/L 27.3   BUN mg/dL 13   CREATININE mg/dL 0.83   CALCIUM mg/dL 9.8   BILIRUBIN mg/dL 0.3   ALK PHOS U/L 96   ALT (SGPT) U/L 37*   AST (SGOT) U/L 21   GLUCOSE mg/dL 109*     Imaging Results (Last 24 Hours)     Procedure Component Value Units Date/Time    MRI Brain Without Contrast [[id number]] Collected: 12/13/22 1435     Updated: 12/13/22 1435    Narrative:      MRI EXAMINATION OF BRAIN WITHOUT CONTRAST     HISTORY: TIA.     COMPARISON: CT angiogram neck and head 12/13/2022. No prior MRIs  available for comparison.     TECHNIQUE: A MRI examination of the brain was performed utilizing  sagittal T1, axial diffusion, T1, T2, T2 FLAIR and gradient echo T2  imaging.     FINDINGS: There is no evidence of restricted diffusion, hydrocephalus or  of abnormal extra-axial fluid. There is expected flow-void in the  basilar artery and in the distal aspect of the internal carotid arteries  bilaterally on the axial T2 sequence. There is a trace amount of fluid  in the mastoid air cells on the right and a small volume of fluid in the  mastoid air cells on the left. Mild-to-moderate mucosal thickening  involving the ethmoid air cells on the right anteriorly is noted. There  is no evidence of an intracranial mass or mass effect on this  noncontrasted MRI examination of the brain. Mild small vessel ischemic  disease is noted.       Impression:      There is no evidence of acute infarction. Mild small vessel  ischemic disease is noted. See above.       CT Angiogram Neck [[id number]] Collected: 12/13/22 1029     Updated: 12/13/22 1029    Narrative:      CONTRAST-ENHANCED CT ANGIOGRAM OF THE HEAD AND NECK ON 12/13/2022     CLINICAL HISTORY: Patient has intermittent expressive aphasia, visual  field defects and facial numbness.     TECHNIQUE:  Spiral CT images were obtained from the base of the skull to  the vertex both pre and postintravenous contrast and these images were  reformatted and submitted in 3 mm thick axial, sagittal and coronal CT  sections with brain algorithm and additional spiral CT angiogram images  were obtained from the top of the aortic arch up through the great  vessels of the head and neck during arterial phase of contrast and  images were reformatted and submitted in 1 mm thick axial, sagittal and  coronal CT sections with soft tissue algorithm and 3-D reconstructions  were performed to complete the CT angiogram of the head and neck.     COMPARISON: There are no prior studies from [facility]  for comparison.     FINDINGS: There is very minimal low-density in the frontal  periventricular white matter consistent with very minimal small vessel  disease. The remainder of the brain parenchyma is normal in attenuation.  The ventricles are normal in size. I see no focal mass effect. There is  no midline shift. No extra-axial fluid collections are identified. No  abnormal areas of enhancement are seen in the head. The calvarium and  the skull base are normal in appearance. There is minimal inferomedial  right frontal sinus and frontal recess and anterior right ethmoid sinus  mucosal thickening. The remainder of the paranasal sinuses and mastoid  air cells and middle ear cavities are clear.     CT ANGIOGRAM OF THE NECK: The nasopharynx, oropharynx, the hypopharynx,  the true cords and subglottic airway are normal in appearance. The  thyroid gland enhances homogeneously and is normal in appearance. The  lung apices are clear. The parotid,  parapharyngeal and  submandibular spaces are symmetric and are normal in appearance. Mild  cervical spondylosis is present with 1 mm anterolisthesis of C3 on C4  and C4 on C5 and there is mild disc space narrowing and degenerative  endplate changes of C5-6 with mild posterior  endplate spurring and  uncovertebral joint hypertrophy contributing to mild canal and there is  mild up to mild-to-moderate left bony foraminal narrowing at C5-6,  otherwise the cervical spine is unremarkable. There is anatomic origin  of the great vessels off the aortic arch. The left subclavian artery  origin is normal in appearance and no stenosis is seen in the left  subclavian artery. The left vertebral artery origin is normal in  appearance and no stenosis is seen in the left vertebral artery from its  origin to the vertebrobasilar junction. The left common carotid origin  is normal in appearance and no stenosis is seen in the left common  carotid artery and its bifurcation into the left internal and external  carotid arteries is normal in appearance and no stenosis is seen in the  cervical segment of the left internal carotid artery using the NASCET  criteria. The brachiocephalic artery origin is normal in appearance and  no stenosis is seen in the brachiocephalic artery and its bifurcation  into the right subclavian and common carotid arteries is normal in  appearance and no stenosis is seen in the right subclavian artery. The  right vertebral artery origin is normal in appearance and no stenosis is  seen in the right vertebral artery from its origin to the  vertebrobasilar junction. The right common carotid origin is normal in  appearance and no stenosis seen in the right common carotid artery and  its bifurcation into the right internal and external carotid arteries is  normal in appearance and no stenosis is seen in the cervical segment of  the right internal carotid artery using the NASCET criteria.     CT ANGIOGRAM OF THE HEAD: The intracranial segments of distal vertebral  arteries are widely patent to the vertebrobasilar junction without  stenosis. The basilar artery and the basilar tip is normal in  appearance. There is an atretic P1 segment of the left posterior  cerebral artery with persistent  fetal origin of left posterior cerebral  artery off the backwall of the supracavernous left internal carotid  artery which is a normal anatomic variation and both the right and left  posterior cerebral and superior cerebellar arteries are within normal  limits. The upper cervical, petrous, cavernous and supracavernous  segments of the internal carotid arteries are within normal limits. The  ophthalmic artery origins are within normal limits. The A1 segments of  the anterior cerebral arteries and the anterior communicating artery  origin A2 branches of the anterior cerebral arteries are normal in  appearance bilaterally. The M1 segments of middle cerebral arteries and  middle cerebral artery bifurcations are within normal limits. The  visualized M2 branches of middle cerebral arteries appear widely patent  [ ] fissures.       Impression:      CT of the head demonstrates very minimal small vessel disease in the  frontal periventricular white matter, minimal mucosal thickening medial  right frontal sinus, right frontal recess and anterior right ethmoid  sinus. Otherwise the head CT is within normal limits with no acute  completed infarct or intracranial hemorrhage identified. CT angiogram of  the head and neck is within normal limits with no stenosis seen in the  great vessels of the neck, in particular no vertebral artery stenosis is  seen and no stenosis is seen in the cervical segments of the internal  carotid arteries using the NASCET criteria and no intracranial vascular  stenoses or occlusions are identified and the etiology of this patient's  intermittent expressive aphasia and visual field defect and facial  numbness is not established on this exam. If there remains any clinical  suspicion of an acute infarct I recommend an MRI of the brain for more  complete assessment. The results and recommendation were communicated to  Weston Hi, the Physician's Assistant in the ER taking care of the  patient, by telephone  on 12/13/2022 at 10:00 AM.     Radiation dose reduction techniques were utilized, including automated  exposure control and exposure modulation based on body size.          CT Angiogram Head [[id number]] Collected: 12/13/22 1029     Updated: 12/13/22 1029    Narrative:      CONTRAST-ENHANCED CT ANGIOGRAM OF THE HEAD AND NECK ON 12/13/2022     CLINICAL HISTORY: Patient has intermittent expressive aphasia, visual  field defects and facial numbness.     TECHNIQUE: Spiral CT images were obtained from the base of the skull to  the vertex both pre and postintravenous contrast and these images were  reformatted and submitted in 3 mm thick axial, sagittal and coronal CT  sections with brain algorithm and additional spiral CT angiogram images  were obtained from the top of the aortic arch up through the great  vessels of the head and neck during arterial phase of contrast and  images were reformatted and submitted in 1 mm thick axial, sagittal and  coronal CT sections with soft tissue algorithm and 3-D reconstructions  were performed to complete the CT angiogram of the head and neck.     COMPARISON: There are no prior studies from [facility]  for comparison.     FINDINGS: There is very minimal low-density in the frontal  periventricular white matter consistent with very minimal small vessel  disease. The remainder of the brain parenchyma is normal in attenuation.  The ventricles are normal in size. I see no focal mass effect. There is  no midline shift. No extra-axial fluid collections are identified. No  abnormal areas of enhancement are seen in the head. The calvarium and  the skull base are normal in appearance. There is minimal inferomedial  right frontal sinus and frontal recess and anterior right ethmoid sinus  mucosal thickening. The remainder of the paranasal sinuses and mastoid  air cells and middle ear cavities are clear.     CT ANGIOGRAM OF THE NECK: The nasopharynx, oropharynx, the hypopharynx,  the  true cords and subglottic airway are normal in appearance. The  thyroid gland enhances homogeneously and is normal in appearance. The  lung apices are clear. The parotid,  parapharyngeal and  submandibular spaces are symmetric and are normal in appearance. Mild  cervical spondylosis is present with 1 mm anterolisthesis of C3 on C4  and C4 on C5 and there is mild disc space narrowing and degenerative  endplate changes of C5-6 with mild posterior endplate spurring and  uncovertebral joint hypertrophy contributing to mild canal and there is  mild up to mild-to-moderate left bony foraminal narrowing at C5-6,  otherwise the cervical spine is unremarkable. There is anatomic origin  of the great vessels off the aortic arch. The left subclavian artery  origin is normal in appearance and no stenosis is seen in the left  subclavian artery. The left vertebral artery origin is normal in  appearance and no stenosis is seen in the left vertebral artery from its  origin to the vertebrobasilar junction. The left common carotid origin  is normal in appearance and no stenosis is seen in the left common  carotid artery and its bifurcation into the left internal and external  carotid arteries is normal in appearance and no stenosis is seen in the  cervical segment of the left internal carotid artery using the NASCET  criteria. The brachiocephalic artery origin is normal in appearance and  no stenosis is seen in the brachiocephalic artery and its bifurcation  into the right subclavian and common carotid arteries is normal in  appearance and no stenosis is seen in the right subclavian artery. The  right vertebral artery origin is normal in appearance and no stenosis is  seen in the right vertebral artery from its origin to the  vertebrobasilar junction. The right common carotid origin is normal in  appearance and no stenosis seen in the right common carotid artery and  its bifurcation into the right internal and external  carotid arteries is  normal in appearance and no stenosis is seen in the cervical segment of  the right internal carotid artery using the NASCET criteria.     CT ANGIOGRAM OF THE HEAD: The intracranial segments of distal vertebral  arteries are widely patent to the vertebrobasilar junction without  stenosis. The basilar artery and the basilar tip is normal in  appearance. There is an atretic P1 segment of the left posterior  cerebral artery with persistent fetal origin of left posterior cerebral  artery off the backwall of the supracavernous left internal carotid  artery which is a normal anatomic variation and both the right and left  posterior cerebral and superior cerebellar arteries are within normal  limits. The upper cervical, petrous, cavernous and supracavernous  segments of the internal carotid arteries are within normal limits. The  ophthalmic artery origins are within normal limits. The A1 segments of  the anterior cerebral arteries and the anterior communicating artery  origin A2 branches of the anterior cerebral arteries are normal in  appearance bilaterally. The M1 segments of middle cerebral arteries and  middle cerebral artery bifurcations are within normal limits. The  visualized M2 branches of middle cerebral arteries appear widely patent  [ ] fissures.       Impression:      CT of the head demonstrates very minimal small vessel disease in the  frontal periventricular white matter, minimal mucosal thickening medial  right frontal sinus, right frontal recess and anterior right ethmoid  sinus. Otherwise the head CT is within normal limits with no acute  completed infarct or intracranial hemorrhage identified. CT angiogram of  the head and neck is within normal limits with no stenosis seen in the  great vessels of the neck, in particular no vertebral artery stenosis is  seen and no stenosis is seen in the cervical segments of the internal  carotid arteries using the NASCET criteria and no intracranial  vascular  stenoses or occlusions are identified and the etiology of this patient's  intermittent expressive aphasia and visual field defect and facial  numbness is not established on this exam. If there remains any clinical  suspicion of an acute infarct I recommend an MRI of the brain for more  complete assessment. The results and recommendation were communicated to  Weston Hi, the Physician's Assistant in the ER taking care of the  patient, by telephone on 12/13/2022 at 10:00 AM.     Radiation dose reduction techniques were utilized, including automated  exposure control and exposure modulation based on body size.                I have reviewed the medications.  ---------------------------------------------------------------------------------------------  Assessment & Plan   Assessment/Problem List    TIA (transient ischemic attack)      Plan:    Expressive aphasia  Visual disturbance  Facial paresthesia  -TIA work-up  -CTA head and neck negative acute  -MRI brain-T2 hyperintensity suggestive of migraine  -Neurology consulted, please see their note for further details  -Consistent with migraine with aura     Hypothyroidism  -Continue home medications as prescribed    Disposition: Home    Follow-up after Discharge: PCP, neurology as needed    This note will serve as discharge summary    BRIAN Louis 12/13/22 15:49 EST

## 2022-12-13 NOTE — DISCHARGE INSTRUCTIONS
Follow up with neurology as needed.  Return to the emergency department with worsening symptoms, uncontrolled pain, inability to tolerate oral liquids, fever greater than 101°F not controlled by Tylenol or as needed with emergent concerns.

## 2022-12-13 NOTE — ED NOTES
Pt ambulatory to triage from home with c/o of possible stroke.  States due to have multiple scans this week (mri, eye scan, etc).  Pt states eye started twitching, face was heating up and ears hurting, also c/o pain up left side of neck this morning.  Pt with no neuro deficits at triage, although pt states left side of face has less sensation.  Pt wearing mask in triage. Triage personnel wore appropriate PPE

## 2022-12-13 NOTE — ED PROVIDER NOTES
MD ATTESTATION NOTE    The ZURDO and I have discussed this patient's history, physical exam, and treatment plan.  I have reviewed the documentation and personally had a face to face interaction with the patient. I affirm the documentation and agree with the treatment and plan.  The attached note describes my personal findings.    I provided a substantive portion of the care of this patient. I personally performed the physical exam, in its entirety.    History  62-year-old female with history of multiple vague neurologic complaints over the last 2 weeks.  Patient was supposed to obtain work-up as outpatient but came in this morning due to visual field deficit which is subsequently resolved.    Physical Exam  Vital Signs reviewed  GENERAL: Alert female no obvious distress.  Triage vitals reviewed and notable for initial blood pressure of 159/88 and improved to 138/74  HENT: nares patent  EYES: no scleral icterus  CV: regular rhythm, regular rate-no murmur  RESPIRATORY: normal effort, clear to auscultation bilaterally  ABDOMEN: soft, nontender to palpation  MUSCULOSKELETAL: no deformity  NEURO: Strength sensation and coordination are grossly intact.  Speech and mentation are unremarkable  SKIN: warm, dry      Disposition  I discussed treatment and evaluation this patient with BRIAN Hi.  Patient with symptoms concerning for possible TIA.  Would also consider multiple sclerosis with multiple vague neurodeficits.  Would plan observation admission for further neuroimaging and neurology consultation.    EKG          EKG time: 0806  Rhythm/Rate: Sinus 66  P waves and AZ: Normal P waves and AZ interval  QRS, axis: Normal axis, normal QRS  ST and T waves: Unremarkable ST and T wave    Interpreted Contemporaneously by me, independently viewed  No prior to compare         Nick Todd MD  12/13/22 8894

## 2022-12-13 NOTE — CONSULTS
"Neurology Consult Note    Consult Date: 12/13/2022    Referring MD: Dr. Do    Reason for Consult I have been asked to see the patient in neurological consultation to render advice and opinion regarding vision, changes, aphasia    Cailin Garcia is a 62 y.o. female with no prior diagnosis of migraine who presented to the ED complaining of several episodes of transient neurologic symptoms.  She reports that 3 weeks ago she had returned from a vacation where she had done extensive hiking.  She developed an episode of intermittent visual distortions in her bilateral hemifield's that she describes as a blurred partially transparent blocking her vision.  She then developed an episode of word finding difficulty.  She was advised to be evaluated in the emergency department by her PCP but declined.  This morning she experienced an episode of paresthesias in her left face and arm.  This was followed by a moderate to severe pounding headache around the left eye.  Currently she feels back to her neurologic baseline.    Past Medical History:   Diagnosis Date   • Chronic pain disorder Nov 1, 2021    wake up with burning hands and feet   • DDD (degenerative disc disease), cervical 09/07/2022   • Extremity pain Nov 1, 2021    ache in big toes   • H/O bone density study NEVER   • H/O complete eye exam DUE   • Hypothyroidism    • Neck pain Nov 1, 2021    wake up with base of skull and neck pain   • TIA (transient ischemic attack) 12/13/2022       ROS:  No fevers, chills  No weakness, + headache, numbness    Exam  /75 (BP Location: Right arm, Patient Position: Lying)   Pulse 72   Temp 97.8 °F (36.6 °C) (Oral)   Resp 18   Ht 165.1 cm (65\")   Wt 68 kg (150 lb)   SpO2 100%   BMI 24.96 kg/m²   Gen: NAD, vitals reviewed  MS: oriented x3, recent/remote memory intact, normal attention/concentration, language intact, no neglect.  CN: visual acuity grossly normal, PERRL, EOMI, no facial droop, no dysarthria  Motor: 5/5 " throughout upper and lower extremities, normal tone    DATA:    Lab Results   Component Value Date    GLUCOSE 109 (H) 12/13/2022    CALCIUM 9.8 12/13/2022     12/13/2022    K 3.9 12/13/2022    CO2 27.3 12/13/2022     12/13/2022    BUN 13 12/13/2022    CREATININE 0.83 12/13/2022    EGFRIFAFRI 92 04/12/2021    EGFRIFNONA 76 04/12/2021    BCR 15.7 12/13/2022    ANIONGAP 9.7 12/13/2022     Lab Results   Component Value Date    WBC 6.05 12/13/2022    HGB 13.4 12/13/2022    HCT 40.3 12/13/2022    MCV 92.2 12/13/2022     12/13/2022       Lab review: CBC, BMP unremarkable    Imaging review: I personally reviewed her MRI of the brain which is significant for several cortical and subcortical T2 hyperintensities suggestive of migraine.  No acute abnormality noted.  Radiology report reviewed.  CTA shows no significant flow-limiting stenosis.    Diagnoses:  Migraine with aura, without status migrainosus, not intractable    Comment: History consistent with new onset migraine with aura.  MRI and CTA reassuring    PLAN:  No further inpatient neurologic work-up needed

## 2022-12-13 NOTE — CASE MANAGEMENT/SOCIAL WORK
Discharge Planning Assessment  [facility]     Patient Name: Cailin Garcia  MRN: [id number]  Today's Date: 12/13/2022    Admit Date: 12/13/2022        Discharge Needs Assessment     Row Name 12/13/22 1638       Living Environment    People in Home spouse    Name(s) of People in Home Johann Garcia- spouse    Current Living Arrangements home    Primary Care Provided by self    Provides Primary Care For no one    Family Caregiver if Needed spouse    Quality of Family Relationships helpful;involved;supportive    Able to Return to Prior Arrangements yes       Resource/Environmental Concerns    Resource/Environmental Concerns none       Transition Planning    Patient/Family Anticipates Transition to home with family    Patient/Family Anticipated Services at Transition none    Transportation Anticipated family or friend will provide       Discharge Needs Assessment    Equipment Currently Used at Home none    Concerns to be Addressed denies needs/concerns at this time    Anticipated Changes Related to Illness none    Equipment Needed After Discharge none    Provided Post Acute Provider List? N/A               Discharge Plan     Row Name 12/13/22 1632       Plan    Plan Comments Entered room, introduced self and explained role w/PPE in place on self, patient and spouse; received permission to speak in front of spouse; verified information on facesheet; patient lives in a house w/spouse- is independent w/ADL's, is retired, still drives; verified PCP listed on facesheet; RX are filled at [facility] on [address]; Pt presented to the ER today after episode of left facial numbness- previous episodes of visual disturbances as well over the past week. Patient denies any d/c needs at this time- spouse plans to drive transport home upon d/c.              Continued Care and Services - Admitted Since 12/13/2022    Coordination has not been started for this encounter.       Expected Discharge Date and Time     Expected  Discharge Date Expected Discharge Time    Dec 13, 2022          Demographic Summary     Row Name 12/13/22 1637       General Information    Admission Type observation    Arrived From home    Reason for Consult discharge planning    Preferred Language English       Contact Information    Permission Granted to Share Info With                Functional Status     Row Name 12/13/22 1637       Functional Status    Usual Activity Tolerance excellent    Current Activity Tolerance good       Assessment of Health Literacy    How often do you have someone help you read hospital materials? Never    How often do you have problems learning about your medical condition because of difficulty understanding written information? Never    How often do you have a problem understanding what is told to you about your medical condition? Never    How confident are you filling out medical forms by yourself? Extremely    Health Literacy Good       Functional Status, IADL    Medications independent    Meal Preparation independent    Housekeeping independent    Laundry independent    Shopping independent       Mental Status    General Appearance WDL WDL       Mental Status Summary    Recent Changes in Mental Status/Cognitive Functioning no changes       Employment/    Employment Status retired               Psychosocial    No documentation.                Abuse/Neglect    No documentation.                Legal    No documentation.                Substance Abuse    No documentation.                Patient Forms    No documentation.                   Griselda Miller RN

## 2022-12-14 ENCOUNTER — TRANSITIONAL CARE MANAGEMENT TELEPHONE ENCOUNTER (OUTPATIENT)
Dept: CALL CENTER | Facility: HOSPITAL | Age: 62
End: 2022-12-14

## 2022-12-14 NOTE — OUTREACH NOTE
Call Center TCM Note    Flowsheet Row Responses   [facility] patient discharged from? [place]   Does the patient have one of the following disease processes/diagnoses(primary or secondary)? Stroke   TCM attempt successful? Yes   Call start time 1558   Call end time 1559   Discharge diagnosis TIA   Person spoke with today (if not patient) and relationship Patient   Meds reviewed with patient/caregiver? 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 No   Nursing Interventions Patient declined scheduling/rescheduling appointment at this time   Has home health visited the patient within 72 hours of discharge? N/A   Psychosocial issues? No   Does the patient have any residual symptoms from stroke/TIA? No   Did the patient receive a copy of their discharge instructions? Yes   Nursing interventions Reviewed instructions with patient   What is the patient's perception of their health status since discharge? Returned to baseline/stable   Is the patient/caregiver able to teach back signs and symptoms related to disease process for when to call PCP? Yes   Is the patient/caregiver able to teach back signs and symptoms related to disease process for when to call 911? Yes   Is the patient/caregiver able to teach back the hierarchy of who to call/visit for symptoms/problems? PCP, Specialist, Home health nurse, Urgent Care, ED, 911 Yes   Is the patient able to teach back FAST for Stroke? B alance: Watch for sudden loss of balance, E yes: Check for vision loss, F ace: Look for an uneven smile, A rm: Check if one arm is weak, S peech: Listen for slurred speech, T ranjith: Call 9-1-1 right away   TCM call completed? Yes   Wrap up additional comments Patient diagnosed  with TIA- No residuals from symptoms. 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   Call end time 2867   Would this patient benefit from a Referral to [facility] Social Work? No   Is the patient interested in additional calls from an ambulatory ?  NOTE:  applies to high risk patients requiring additional follow-up. No          Catrina Harris RN    12/14/2022, 16:00 EST

## 2022-12-14 NOTE — OUTREACH NOTE
Prep Survey    Flowsheet Row Responses   [facility] facility patient discharged from? [facility]   Is LACE score < 7 ? Yes   Eligibility [facility]   Date of Admission 12/13/22   Date of Discharge 12/13/22   Discharge Disposition Home or Self Care   Discharge diagnosis TIA   Does the patient have one of the following disease processes/diagnoses(primary or secondary)? Stroke   Does the patient have Home health ordered? No   Is there a DME ordered? No   Prep survey completed? Yes          LEYDI COSTELLO - Registered Nurse

## 2023-04-21 ENCOUNTER — TELEPHONE (OUTPATIENT)
Dept: PAIN MEDICINE | Facility: CLINIC | Age: 63
End: 2023-04-21
Payer: COMMERCIAL

## 2023-04-21 DIAGNOSIS — G62.9 NEUROPATHY: ICD-10-CM

## 2023-04-21 RX ORDER — GABAPENTIN 300 MG/1
300 CAPSULE ORAL 4 TIMES DAILY
Qty: 120 CAPSULE | Refills: 0 | Status: SHIPPED | OUTPATIENT
Start: 2023-04-21

## 2023-04-21 NOTE — TELEPHONE ENCOUNTER
Will do.  Can you please make sure she schedules a follow-up in May for further refills? Never smoker

## 2023-04-21 NOTE — TELEPHONE ENCOUNTER
Provider: DR. KENDRICK    Caller: PATIENT    Relationship to Patient: SELF    Pharmacy: [facility]- [phone]    Phone Number: [phone]    Reason for Call: PT. IS LEAVING FOR A TRIP ON 04/30/23- SHE WILL BE GONE FOR 3 WEEKS.   SHE WILL RUN OUT OF GABAPENTIN WHEN SHE IS TRAVELLING.   SHE STATES THAT THE REFILL WILL BE DUE ON 05/04/23.   [facility] TOLD HER THAT SHE NEEDS GABAPENTIN 300 MG. TO BE SENT IN EARLY BEFORE SHE LEAVES ON 04/30/23.   PLEASE CALL TO ADVISE.

## 2023-04-21 NOTE — TELEPHONE ENCOUNTER
Called pt to schedule OV . She stated she has to first follow up with PCP,will call us back to scheduled OV . Is going out of town and wont be back until end of May.

## 2023-05-24 PROBLEM — G43.109 MIGRAINE WITH AURA AND WITHOUT STATUS MIGRAINOSUS, NOT INTRACTABLE: Status: ACTIVE | Noted: 2023-05-24

## 2023-05-24 RX ORDER — ROPINIROLE 0.25 MG/1
0.25 TABLET, FILM COATED ORAL NIGHTLY
Qty: 90 TABLET | Refills: 3 | Status: CANCELLED | OUTPATIENT
Start: 2023-05-24

## 2023-05-24 NOTE — PROGRESS NOTES
Subjective   Cailin Garcia is a 62 y.o. female.     History of Present Illness     Chief Complaint:   Chief Complaint   Patient presents with   • Med Refill   • Thyroid Problem   • Cough       Cailin Garcia 62 y.o. female who presents today for Medical Management of the below listed issues. She  has a problem list of   Patient Active Problem List   Diagnosis   • Post-cholecystectomy syndrome   • Hypothyroidism   • Hair loss   • Screen for colon cancer   • DDD (degenerative disc disease), cervical   • B12 deficiency   • RLS (restless legs syndrome)   • Folate deficiency   • TIA (transient ischemic attack)   • Migraine with aura and without status migrainosus, not intractable   • Rosacea   .  Since the last visit, She has overall felt well until 1 week ago with colored d/c, cough/congestion, no f/c..  she has been compliant with   Current Outpatient Medications:   •  aspirin 81 MG chewable tablet, Chew 1 tablet Daily., Disp: , Rfl:   •  Cholecalciferol (Vitamin D) 50 MCG (2000 UT) tablet, Take 2,000 Units by mouth Daily., Disp: , Rfl:   •  gabapentin (NEURONTIN) 300 MG capsule, Take 1 capsule by mouth 4 (Four) Times a Day., Disp: 120 capsule, Rfl: 0  •  vitamin B-12 (CYANOCOBALAMIN) 1000 MCG tablet, Take 1 tablet by mouth Daily., Disp: , Rfl:   •  azithromycin (Zithromax Z-Valentin) 250 MG tablet, Take 2 tablets the first day, then 1 tablet daily for 4 days., Disp: 6 tablet, Rfl: 0  •  folic acid (FOLVITE) 1 MG tablet, Take 1 tablet by mouth Daily., Disp: 90 tablet, Rfl: 3  •  levothyroxine (Synthroid) 25 MCG tablet, Take 1 tablet by mouth Daily., Disp: 90 tablet, Rfl: 3  •  metroNIDAZOLE (METROGEL) 0.75 % gel, Apply  topically to the appropriate area as directed 2 (Two) Times a Day., Disp: 45 g, Rfl: 11.  She reports medication side effects with the Requip and stopped it.     All of the other chronic condition(s) listed above are stable w/o issues.    /63 (BP Location: Left arm, Patient Position: Sitting)   Pulse  "62   Temp 98 °F (36.7 °C) (Oral)   Resp 16   Ht 165.1 cm (65\")   Wt 67.1 kg (148 lb)   SpO2 97%   BMI 24.63 kg/m²     Results for orders placed or performed during the hospital encounter of 12/13/22   Comprehensive Metabolic Panel    Specimen: Blood   Result Value Ref Range    Glucose 109 (H) 65 - 99 mg/dL    BUN 13 8 - 23 mg/dL    Creatinine 0.83 0.57 - 1.00 mg/dL    Sodium 141 136 - 145 mmol/L    Potassium 3.9 3.5 - 5.2 mmol/L    Chloride 104 98 - 107 mmol/L    CO2 27.3 22.0 - 29.0 mmol/L    Calcium 9.8 8.6 - 10.5 mg/dL    Total Protein 6.2 6.0 - 8.5 g/dL    Albumin 4.00 3.50 - 5.20 g/dL    ALT (SGPT) 37 (H) 1 - 33 U/L    AST (SGOT) 21 1 - 32 U/L    Alkaline Phosphatase 96 39 - 117 U/L    Total Bilirubin 0.3 0.0 - 1.2 mg/dL    Globulin 2.2 gm/dL    A/G Ratio 1.8 g/dL    BUN/Creatinine Ratio 15.7 7.0 - 25.0    Anion Gap 9.7 5.0 - 15.0 mmol/L    eGFR 79.3 >60.0 mL/min/1.73   Troponin    Specimen: Blood   Result Value Ref Range    Troponin T <0.010 0.000 - 0.030 ng/mL   CBC Auto Differential    Specimen: Blood   Result Value Ref Range    WBC 6.05 3.40 - 10.80 10*3/mm3    RBC 4.37 3.77 - 5.28 10*6/mm3    Hemoglobin 13.4 12.0 - 15.9 g/dL    Hematocrit 40.3 34.0 - 46.6 %    MCV 92.2 79.0 - 97.0 fL    MCH 30.7 26.6 - 33.0 pg    MCHC 33.3 31.5 - 35.7 g/dL    RDW 12.5 12.3 - 15.4 %    RDW-SD 42.0 37.0 - 54.0 fl    MPV 10.5 6.0 - 12.0 fL    Platelets 263 140 - 450 10*3/mm3    Neutrophil % 49.9 42.7 - 76.0 %    Lymphocyte % 36.0 19.6 - 45.3 %    Monocyte % 8.3 5.0 - 12.0 %    Eosinophil % 4.6 0.3 - 6.2 %    Basophil % 0.7 0.0 - 1.5 %    Immature Grans % 0.5 0.0 - 0.5 %    Neutrophils, Absolute 3.02 1.70 - 7.00 10*3/mm3    Lymphocytes, Absolute 2.18 0.70 - 3.10 10*3/mm3    Monocytes, Absolute 0.50 0.10 - 0.90 10*3/mm3    Eosinophils, Absolute 0.28 0.00 - 0.40 10*3/mm3    Basophils, Absolute 0.04 0.00 - 0.20 10*3/mm3    Immature Grans, Absolute 0.03 0.00 - 0.05 10*3/mm3    nRBC 0.0 0.0 - 0.2 /100 WBC   Hemoglobin A1c    " Specimen: Blood   Result Value Ref Range    Hemoglobin A1C 5.60 4.80 - 5.60 %   Lipid Panel    Specimen: Blood   Result Value Ref Range    Total Cholesterol 217 (H) 0 - 200 mg/dL    Triglycerides 127 0 - 150 mg/dL    HDL Cholesterol 59 40 - 60 mg/dL    LDL Cholesterol  135 (H) 0 - 100 mg/dL    VLDL Cholesterol 23 5 - 40 mg/dL    LDL/HDL Ratio 2.25    ECG 12 Lead Stroke Evaluation   Result Value Ref Range    QT Interval 432 ms             The following portions of the patient's history were reviewed and updated as appropriate: allergies, current medications, past family history, past medical history, past social history, past surgical history, and problem list.    Review of Systems   Constitutional: Negative for activity change, chills and fever.   Respiratory: Negative for cough.    Cardiovascular: Negative for chest pain.   Psychiatric/Behavioral: Negative for dysphoric mood.       Objective   Physical Exam  Constitutional:       General: She is not in acute distress.     Appearance: She is well-developed.   Cardiovascular:      Rate and Rhythm: Normal rate and regular rhythm.   Pulmonary:      Effort: Pulmonary effort is normal.      Breath sounds: Normal breath sounds.   Neurological:      Mental Status: She is alert and oriented to person, place, and time.   Psychiatric:         Behavior: Behavior normal.         Thought Content: Thought content normal.     Labs reviewed with pt today during visit. All questions answered.          Diagnoses and all orders for this visit:    1. Acute URI (Primary)  -     azithromycin (Zithromax Z-Valentin) 250 MG tablet; Take 2 tablets the first day, then 1 tablet daily for 4 days.  Dispense: 6 tablet; Refill: 0    2. Acquired hypothyroidism  -     levothyroxine (Synthroid) 25 MCG tablet; Take 1 tablet by mouth Daily.  Dispense: 90 tablet; Refill: 3    3. Folate deficiency  -     folic acid (FOLVITE) 1 MG tablet; Take 1 tablet by mouth Daily.  Dispense: 90 tablet; Refill: 3    4.  Rosacea  -     metroNIDAZOLE (METROGEL) 0.75 % gel; Apply  topically to the appropriate area as directed 2 (Two) Times a Day.  Dispense: 45 g; Refill: 11

## 2023-05-25 ENCOUNTER — OFFICE VISIT (OUTPATIENT)
Dept: FAMILY MEDICINE CLINIC | Facility: CLINIC | Age: 63
End: 2023-05-25
Payer: COMMERCIAL

## 2023-05-25 VITALS
OXYGEN SATURATION: 97 % | WEIGHT: 148 LBS | TEMPERATURE: 98 F | BODY MASS INDEX: 24.66 KG/M2 | RESPIRATION RATE: 16 BRPM | HEIGHT: 65 IN | DIASTOLIC BLOOD PRESSURE: 63 MMHG | HEART RATE: 62 BPM | SYSTOLIC BLOOD PRESSURE: 118 MMHG

## 2023-05-25 DIAGNOSIS — E53.8 FOLATE DEFICIENCY: ICD-10-CM

## 2023-05-25 DIAGNOSIS — J06.9 ACUTE URI: Primary | ICD-10-CM

## 2023-05-25 DIAGNOSIS — E03.9 ACQUIRED HYPOTHYROIDISM: Chronic | ICD-10-CM

## 2023-05-25 DIAGNOSIS — L71.9 ROSACEA: ICD-10-CM

## 2023-05-25 PROCEDURE — 99214 OFFICE O/P EST MOD 30 MIN: CPT | Performed by: FAMILY MEDICINE

## 2023-05-25 RX ORDER — AZITHROMYCIN 250 MG/1
TABLET, FILM COATED ORAL
Qty: 6 TABLET | Refills: 0 | Status: SHIPPED | OUTPATIENT
Start: 2023-05-25

## 2023-05-25 RX ORDER — FOLIC ACID 1 MG/1
1 TABLET ORAL DAILY
Qty: 90 TABLET | Refills: 3 | Status: SHIPPED | OUTPATIENT
Start: 2023-05-25

## 2023-05-25 RX ORDER — METRONIDAZOLE 7.5 MG/G
GEL TOPICAL 2 TIMES DAILY
Qty: 45 G | Refills: 11 | Status: SHIPPED | OUTPATIENT
Start: 2023-05-25

## 2023-05-25 RX ORDER — LEVOTHYROXINE SODIUM 0.03 MG/1
25 TABLET ORAL DAILY
Qty: 90 TABLET | Refills: 3 | Status: SHIPPED | OUTPATIENT
Start: 2023-05-25

## 2023-06-01 ENCOUNTER — OFFICE VISIT (OUTPATIENT)
Dept: PAIN MEDICINE | Facility: CLINIC | Age: 63
End: 2023-06-01

## 2023-06-01 VITALS
HEART RATE: 71 BPM | OXYGEN SATURATION: 98 % | BODY MASS INDEX: 24.83 KG/M2 | TEMPERATURE: 97.5 F | WEIGHT: 149 LBS | DIASTOLIC BLOOD PRESSURE: 79 MMHG | HEIGHT: 65 IN | SYSTOLIC BLOOD PRESSURE: 130 MMHG

## 2023-06-01 DIAGNOSIS — G62.9 NEUROPATHY: ICD-10-CM

## 2023-06-01 DIAGNOSIS — G89.4 CHRONIC PAIN SYNDROME: Primary | ICD-10-CM

## 2023-06-01 RX ORDER — GABAPENTIN 300 MG/1
300 CAPSULE ORAL 4 TIMES DAILY
Qty: 120 CAPSULE | Refills: 5 | Status: SHIPPED | OUTPATIENT
Start: 2023-06-01

## 2023-11-03 ENCOUNTER — APPOINTMENT (OUTPATIENT)
Dept: WOMENS IMAGING | Facility: HOSPITAL | Age: 63
End: 2023-11-03
Payer: COMMERCIAL

## 2023-11-03 PROCEDURE — 77063 BREAST TOMOSYNTHESIS BI: CPT | Performed by: RADIOLOGY

## 2023-11-03 PROCEDURE — 77067 SCR MAMMO BI INCL CAD: CPT | Performed by: RADIOLOGY

## 2023-11-15 ENCOUNTER — TELEPHONE (OUTPATIENT)
Dept: FAMILY MEDICINE CLINIC | Facility: CLINIC | Age: 63
End: 2023-11-15

## 2023-11-15 DIAGNOSIS — E03.9 ACQUIRED HYPOTHYROIDISM: Primary | ICD-10-CM

## 2023-11-15 DIAGNOSIS — Z00.00 ANNUAL PHYSICAL EXAM: ICD-10-CM

## 2023-11-15 NOTE — TELEPHONE ENCOUNTER
Caller: Cailin Garcia    Relationship: Self    Best call back number: [phone]    What orders are you requesting (i.e. lab or imaging): LABS    In what timeframe would the patient need to come in: ASAP    Where will you receive your lab/imaging services: OFFICE    PLEASE GIVE HER A CALL TO SET UP APPOINTMENT

## 2023-11-17 LAB
ALBUMIN SERPL-MCNC: 4.7 G/DL (ref 3.5–5.2)
ALBUMIN/GLOB SERPL: 2.6 G/DL
ALP SERPL-CCNC: 89 U/L (ref 39–117)
ALT SERPL-CCNC: 41 U/L (ref 1–33)
AST SERPL-CCNC: 25 U/L (ref 1–32)
BASOPHILS # BLD AUTO: 0.03 10*3/MM3 (ref 0–0.2)
BASOPHILS NFR BLD AUTO: 0.6 % (ref 0–1.5)
BILIRUB SERPL-MCNC: 0.4 MG/DL (ref 0–1.2)
BUN SERPL-MCNC: 15 MG/DL (ref 8–23)
BUN/CREAT SERPL: 17.4 (ref 7–25)
CALCIUM SERPL-MCNC: 9.8 MG/DL (ref 8.6–10.5)
CHLORIDE SERPL-SCNC: 103 MMOL/L (ref 98–107)
CHOLEST SERPL-MCNC: 203 MG/DL (ref 0–200)
CO2 SERPL-SCNC: 30 MMOL/L (ref 22–29)
CREAT SERPL-MCNC: 0.86 MG/DL (ref 0.57–1)
EGFRCR SERPLBLD CKD-EPI 2021: 75.5 ML/MIN/1.73
EOSINOPHIL # BLD AUTO: 0.21 10*3/MM3 (ref 0–0.4)
EOSINOPHIL NFR BLD AUTO: 4 % (ref 0.3–6.2)
ERYTHROCYTE [DISTWIDTH] IN BLOOD BY AUTOMATED COUNT: 12.5 % (ref 12.3–15.4)
GLOBULIN SER CALC-MCNC: 1.8 GM/DL
GLUCOSE SERPL-MCNC: 81 MG/DL (ref 65–99)
HCT VFR BLD AUTO: 42.2 % (ref 34–46.6)
HDLC SERPL-MCNC: 51 MG/DL (ref 40–60)
HGB BLD-MCNC: 14.1 G/DL (ref 12–15.9)
IMM GRANULOCYTES # BLD AUTO: 0.01 10*3/MM3 (ref 0–0.05)
IMM GRANULOCYTES NFR BLD AUTO: 0.2 % (ref 0–0.5)
LDLC SERPL CALC-MCNC: 117 MG/DL (ref 0–100)
LYMPHOCYTES # BLD AUTO: 1.79 10*3/MM3 (ref 0.7–3.1)
LYMPHOCYTES NFR BLD AUTO: 34 % (ref 19.6–45.3)
MCH RBC QN AUTO: 30.6 PG (ref 26.6–33)
MCHC RBC AUTO-ENTMCNC: 33.4 G/DL (ref 31.5–35.7)
MCV RBC AUTO: 91.5 FL (ref 79–97)
MONOCYTES # BLD AUTO: 0.36 10*3/MM3 (ref 0.1–0.9)
MONOCYTES NFR BLD AUTO: 6.8 % (ref 5–12)
NEUTROPHILS # BLD AUTO: 2.86 10*3/MM3 (ref 1.7–7)
NEUTROPHILS NFR BLD AUTO: 54.4 % (ref 42.7–76)
NRBC BLD AUTO-RTO: 0 /100 WBC (ref 0–0.2)
PLATELET # BLD AUTO: 306 10*3/MM3 (ref 140–450)
POTASSIUM SERPL-SCNC: 4 MMOL/L (ref 3.5–5.2)
PROT SERPL-MCNC: 6.5 G/DL (ref 6–8.5)
RBC # BLD AUTO: 4.61 10*6/MM3 (ref 3.77–5.28)
SODIUM SERPL-SCNC: 141 MMOL/L (ref 136–145)
T4 FREE SERPL-MCNC: 1.18 NG/DL (ref 0.93–1.7)
TRIGL SERPL-MCNC: 198 MG/DL (ref 0–150)
TSH SERPL DL<=0.005 MIU/L-ACNC: 2.47 UIU/ML (ref 0.27–4.2)
VLDLC SERPL CALC-MCNC: 35 MG/DL (ref 5–40)
WBC # BLD AUTO: 5.26 10*3/MM3 (ref 3.4–10.8)

## 2023-11-20 RX ORDER — GABAPENTIN 300 MG/1
300 CAPSULE ORAL 4 TIMES DAILY
Qty: 120 CAPSULE | Refills: 5 | Status: CANCELLED | OUTPATIENT
Start: 2023-11-20

## 2023-11-21 ENCOUNTER — OFFICE VISIT (OUTPATIENT)
Dept: FAMILY MEDICINE CLINIC | Facility: CLINIC | Age: 63
End: 2023-11-21
Payer: COMMERCIAL

## 2023-11-21 VITALS
BODY MASS INDEX: 23.82 KG/M2 | DIASTOLIC BLOOD PRESSURE: 76 MMHG | RESPIRATION RATE: 16 BRPM | OXYGEN SATURATION: 96 % | TEMPERATURE: 97.5 F | HEART RATE: 70 BPM | SYSTOLIC BLOOD PRESSURE: 125 MMHG | WEIGHT: 143 LBS | HEIGHT: 65 IN

## 2023-11-21 DIAGNOSIS — Z00.00 ANNUAL PHYSICAL EXAM: Primary | ICD-10-CM

## 2023-11-21 PROCEDURE — 93000 ELECTROCARDIOGRAM COMPLETE: CPT | Performed by: FAMILY MEDICINE

## 2023-11-21 PROCEDURE — 99396 PREV VISIT EST AGE 40-64: CPT | Performed by: FAMILY MEDICINE

## 2023-11-21 RX ORDER — ESTRADIOL 0.1 MG/G
CREAM VAGINAL
COMMUNITY
Start: 2023-11-16

## 2023-11-21 NOTE — PROGRESS NOTES
Procedure     ECG 12 Lead    Date/Time: 11/21/2023 2:11 PM  Performed by: Dallas King MD    Authorized by: Dallas King MD  Comparison: not compared with previous ECG   Previous ECG: no previous ECG available  Rhythm: sinus rhythm  Rate: normal  Conduction: conduction normal  ST Segments: ST segments normal  T Waves: T waves normal  QRS axis: normal    Clinical impression: normal ECG

## 2023-11-21 NOTE — PROGRESS NOTES
"Subjective   Cailin Garcia is a 63 y.o. female.     CC: Annual Exam    History of Present Illness     Cailin Garcia 63 y.o. female who presents for an Annual Wellness Visit.  she has a history of   Patient Active Problem List   Diagnosis    Post-cholecystectomy syndrome    Hypothyroidism    Hair loss    Screen for colon cancer    DDD (degenerative disc disease), cervical    B12 deficiency    RLS (restless legs syndrome)    Folate deficiency    TIA (transient ischemic attack)    Migraine with aura and without status migrainosus, not intractable    Rosacea   .  she has been feeling well.   I  reviewed health maintenance with her as part of my preventative care plan.    The following portions of the patient's history were reviewed and updated as appropriate: allergies, current medications, past family history, past medical history, past social history, past surgical history, and problem list.    Review of Systems   Constitutional:  Negative for appetite change, fever and unexpected weight change.   HENT:  Negative for ear pain, facial swelling and sore throat.    Eyes:  Negative for pain and visual disturbance.   Respiratory:  Negative for chest tightness, shortness of breath and wheezing.    Cardiovascular:  Negative for chest pain and palpitations.   Gastrointestinal:  Negative for abdominal pain and blood in stool.   Endocrine: Negative.    Genitourinary:  Negative for difficulty urinating and hematuria.   Musculoskeletal:  Negative for joint swelling.   Neurological:  Negative for tremors, seizures and syncope.   Hematological:  Negative for adenopathy.   Psychiatric/Behavioral: Negative.         /76   Pulse 70   Temp 97.5 °F (36.4 °C) (Oral)   Resp 16   Ht 165.1 cm (65\")   Wt 64.9 kg (143 lb)   SpO2 96%   BMI 23.80 kg/m²     Objective   Physical Exam  Vitals and nursing note reviewed. Exam conducted with a chaperone present.   Constitutional:       Appearance: She is well-developed.   HENT:      " Head: Normocephalic and atraumatic.      Right Ear: External ear normal.      Left Ear: External ear normal.      Mouth/Throat:      Pharynx: No oropharyngeal exudate.   Eyes:      General: No scleral icterus.     Conjunctiva/sclera: Conjunctivae normal.      Pupils: Pupils are equal, round, and reactive to light.   Neck:      Thyroid: No thyromegaly.   Cardiovascular:      Rate and Rhythm: Normal rate and regular rhythm.      Heart sounds: No murmur heard.     No gallop.   Pulmonary:      Effort: Pulmonary effort is normal.      Breath sounds: Normal breath sounds. No wheezing or rales.   Abdominal:      General: Bowel sounds are normal. There is no distension.      Palpations: Abdomen is soft. There is no mass.      Tenderness: There is no abdominal tenderness.      Hernia: No hernia is present.   Musculoskeletal:         General: No tenderness or deformity. Normal range of motion.      Cervical back: Normal range of motion and neck supple.   Lymphadenopathy:      Cervical: No cervical adenopathy.   Skin:     General: Skin is warm and dry.      Findings: No rash.   Neurological:      Mental Status: She is alert and oriented to person, place, and time.      Deep Tendon Reflexes: Reflexes are normal and symmetric.   Psychiatric:         Behavior: Behavior normal.     Labs reviewed with pt today during visit. All questions answered.  Dona present for exam.    Assessment & Plan   Diagnoses and all orders for this visit:    1. Annual physical exam (Primary)  -     ECG 12 Lead    Healthy diet/exercise/weight management discussed with pt.

## 2023-12-04 NOTE — PROGRESS NOTES
The patient has a pain history of the following:  Cervical disc disease  Cervical disc displacement  Cervical spondylosis  Neuropathy     Previous interventions that the patient has received include:   Shoulder injections - Dr. Carroll      Pain medications include:  Gabapentin     Previously: Compounded pain cream (no benefit)     Other conservative modalities which the patient reports using include:  Physical Therapy: no  Chiropractor: yes  Neck or back surgery: no  Past pain management: no  Heat - no benefit   Ice - no benefit      Past Significant Surgical History:  None     HPI:     CHIEF COMPLAINT Neck Pain  Neck pain  Patient stated that she feels heat in her hands and feet.    Subjective   Cailin Garcia is a 63 y.o. female  who presents for follow-up.  She has a history of chronic hand, foot and groin pain described as burning.  At her last visit, her pain was well-controlled with 300mg of gabapentin four times daily.      I also explained that her EMG results support restless leg syndrome as a diagnosis.    History of Present Illness  Today she states that she's doing pretty well.  The only time her burning pain is an issue is at night.  She experiences burning in her hands and feet in the middle of the night despite taking 300mg gabapentin before bed.        PEG Assessment   What number best describes your pain on average in the past week?8  What number best describes how, during the past week, pain has interfered with your enjoyment of life?5  What number best describes how, during the past week, pain has interfered with your general activity?  5    REVIEW OF PERTINENT MEDICAL DATA  No new     The following portions of the patient's history were reviewed and updated as appropriate: allergies, current medications, past family history, past medical history, past social history, past surgical history, and problem list.    Review of Systems   Constitutional:  Negative for activity change and fatigue.  "  Gastrointestinal:  Negative for abdominal pain, constipation and diarrhea.   Genitourinary:  Negative for difficulty urinating and dysuria.   Musculoskeletal:  Positive for neck pain.   Neurological:  Negative for dizziness, weakness, light-headedness, numbness and headaches.   Psychiatric/Behavioral:  Positive for sleep disturbance. Negative for agitation and suicidal ideas. The patient is not nervous/anxious.      I have reviewed and confirmed the accuracy of the ROS as documented by the MA/LPN/RN Samara Bills MD    Vitals:    12/05/23 1010   BP: 122/84   BP Location: Left arm   Patient Position: Sitting   Cuff Size: Large Adult   Pulse: 69   Resp: 18   Temp: 97.1 °F (36.2 °C)   SpO2: 98%   Weight: 65.3 kg (144 lb)   Height: 165.1 cm (65\")   PainSc: 8  Comment: at night   PainLoc: Neck         Objective   Physical Exam  Constitutional:       General: She is not in acute distress.  Pulmonary:      Effort: Pulmonary effort is normal. No respiratory distress.   Skin:     Coloration: Skin is not pale.   Neurological:      Mental Status: She is alert.   Psychiatric:         Mood and Affect: Mood normal.         Thought Content: Thought content normal.             Assessment & Plan   Diagnoses and all orders for this visit:    1. Chronic pain syndrome (Primary)    2. Neuropathy  -     gabapentin (NEURONTIN) 300 MG capsule; Take 1 capsule PO 3 times daily and 2 capsules PO before bed.  Dispense: 150 capsule; Refill: 5          - Increase gabapentin to 1500mg/day.  She will take 300mg TID and 600mg qhs.   - May consider extended release in the future if multiple daily dosing becomes a burden.    - Cailin Garcia reports a pain score of 8.  Given her pain assessment as noted, treatment options were discussed and the following options were decided upon as a follow-up plan to address the patient's pain: prescription for non-opiod analgesics.      --- Follow-up 6 months (she will be out of town, but will call us with any " issues).           LURDES REPORT    As part of the patient's treatment plan, I am prescribing controlled substances. The patient has been made aware of appropriate use of such medications, including potential risk of somnolence, limited ability to drive and/or work safely, and the potential for dependence or overdose. It has also bee made clear that these medications are for use by this patient only, without concomitant use of alcohol or other substances unless prescribed.     As the clinician, I personally reviewed the LURDES from 12/5/23 .    History and physical exam exhibit continued safe and appropriate use of controlled substances.       Samara Bills MD  Pain Management    EMR Dragon/Transcription disclaimer:   Much of this encounter note is an electronic transcription/translation of spoken language to printed text. The electronic translation of spoken language may permit erroneous, or at times, nonsensical words or phrases to be inadvertently transcribed; Although I have reviewed the note for such errors, some may still exist.

## 2023-12-05 ENCOUNTER — OFFICE VISIT (OUTPATIENT)
Dept: PAIN MEDICINE | Facility: CLINIC | Age: 63
End: 2023-12-05
Payer: COMMERCIAL

## 2023-12-05 VITALS
TEMPERATURE: 97.1 F | WEIGHT: 144 LBS | BODY MASS INDEX: 23.99 KG/M2 | OXYGEN SATURATION: 98 % | DIASTOLIC BLOOD PRESSURE: 84 MMHG | SYSTOLIC BLOOD PRESSURE: 122 MMHG | HEART RATE: 69 BPM | RESPIRATION RATE: 18 BRPM | HEIGHT: 65 IN

## 2023-12-05 DIAGNOSIS — G62.9 NEUROPATHY: ICD-10-CM

## 2023-12-05 DIAGNOSIS — G89.4 CHRONIC PAIN SYNDROME: Primary | ICD-10-CM

## 2023-12-05 PROCEDURE — 99214 OFFICE O/P EST MOD 30 MIN: CPT | Performed by: ANESTHESIOLOGY

## 2023-12-05 RX ORDER — GABAPENTIN 300 MG/1
CAPSULE ORAL
Qty: 150 CAPSULE | Refills: 5 | Status: SHIPPED | OUTPATIENT
Start: 2023-12-05

## 2024-03-14 ENCOUNTER — APPOINTMENT (RX ONLY)
Dept: URBAN - METROPOLITAN AREA CLINIC 124 | Facility: CLINIC | Age: 64
Setting detail: DERMATOLOGY
End: 2024-03-14

## 2024-03-14 DIAGNOSIS — L23.7 ALLERGIC CONTACT DERMATITIS DUE TO PLANTS, EXCEPT FOOD: ICD-10-CM

## 2024-03-14 PROCEDURE — ? COUNSELING

## 2024-03-14 PROCEDURE — 96372 THER/PROPH/DIAG INJ SC/IM: CPT

## 2024-03-14 PROCEDURE — ? SEPARATE AND IDENTIFIABLE DOCUMENTATION

## 2024-03-14 PROCEDURE — ? INTRAMUSCULAR KENALOG

## 2024-03-14 PROCEDURE — ? PRESCRIPTION MEDICATION MANAGEMENT

## 2024-03-14 PROCEDURE — 99203 OFFICE O/P NEW LOW 30 MIN: CPT | Mod: 25

## 2024-03-14 PROCEDURE — ? PRESCRIPTION

## 2024-03-14 RX ORDER — FLUOCINONIDE 0.5 MG/G
CREAM TOPICAL BID
Qty: 120 | Refills: 1 | Status: ERX | COMMUNITY
Start: 2024-03-14

## 2024-03-14 RX ADMIN — FLUOCINONIDE: 0.5 CREAM TOPICAL at 00:00

## 2024-03-14 ASSESSMENT — LOCATION SIMPLE DESCRIPTION DERM
LOCATION SIMPLE: UPPER BACK
LOCATION SIMPLE: RIGHT THIGH
LOCATION SIMPLE: CHEST
LOCATION SIMPLE: LEFT BUTTOCK
LOCATION SIMPLE: ABDOMEN
LOCATION SIMPLE: LEFT THIGH

## 2024-03-14 ASSESSMENT — LOCATION DETAILED DESCRIPTION DERM
LOCATION DETAILED: LEFT RIB CAGE
LOCATION DETAILED: LEFT ANTERIOR DISTAL THIGH
LOCATION DETAILED: RIGHT ANTERIOR DISTAL THIGH
LOCATION DETAILED: UPPER STERNUM
LOCATION DETAILED: LEFT BUTTOCK
LOCATION DETAILED: INFERIOR THORACIC SPINE
LOCATION DETAILED: RIGHT LATERAL ABDOMEN

## 2024-03-14 ASSESSMENT — LOCATION ZONE DERM
LOCATION ZONE: LEG
LOCATION ZONE: TRUNK

## 2024-03-14 NOTE — HPI: RASH
What Type Of Note Output Would You Prefer (Optional)?: Standard Output
How Severe Is Your Rash?: mild
Is This A New Presentation, Or A Follow-Up?: Rash
Additional History: She went to urgent care and they prescribed Clotrimazole Betamethasone Dipropionate 1%, 0.05% 6th twice a day \\nSteroid pack started 7th- discontinued Monday

## 2024-03-14 NOTE — PROCEDURE: INTRAMUSCULAR KENALOG
Total Volume (Ccs): 1.5
Concentration (Mg/Ml): 40.0
Add Option For Additional Mediation: No
Consent: The risks of atrophy were reviewed with the patient.
Concentration (Mg/Ml) Of Additional Medication: 2.5
Kenalog Preparation: kenalog
Administered By (Optional): Dr. Moctezuma
Treatment Number (Optional): 1
Ndc# (Optional): NDC 7925-2847-89
Expiration Date (Optional): NOV 2025
Detail Level: None
Lot # (Optional): [id number]

## 2024-03-14 NOTE — PROCEDURE: PRESCRIPTION MEDICATION MANAGEMENT
Render In Strict Bullet Format?: No
Initiate Treatment: Zyrtec 10mg once daily in the morning\\nBenadryl 25mg once daily at night \\nFluocinonide 0.05% cream twice daily for two weeks to the affected areas throughout the body
Detail Level: Zone

## 2024-04-04 ENCOUNTER — APPOINTMENT (RX ONLY)
Dept: URBAN - METROPOLITAN AREA CLINIC 124 | Facility: CLINIC | Age: 64
Setting detail: DERMATOLOGY
End: 2024-04-04

## 2024-04-04 DIAGNOSIS — L81.0 POSTINFLAMMATORY HYPERPIGMENTATION: ICD-10-CM

## 2024-04-04 PROCEDURE — ? COUNSELING

## 2024-04-04 PROCEDURE — 99212 OFFICE O/P EST SF 10 MIN: CPT

## 2024-04-04 PROCEDURE — ? PRESCRIPTION MEDICATION MANAGEMENT

## 2024-04-04 ASSESSMENT — LOCATION ZONE DERM: LOCATION ZONE: TRUNK

## 2024-04-04 ASSESSMENT — LOCATION DETAILED DESCRIPTION DERM: LOCATION DETAILED: RIGHT RIB CAGE

## 2024-04-04 ASSESSMENT — LOCATION SIMPLE DESCRIPTION DERM: LOCATION SIMPLE: ABDOMEN

## 2024-04-04 NOTE — PROCEDURE: PRESCRIPTION MEDICATION MANAGEMENT
Plan: CeraVe itch relief cream otc
Continue Regimen: Fluocinonide cream bid x 5 days.\\nZyrtec tabs otc.\\nBenadryl tabs otc
Detail Level: Zone
Render In Strict Bullet Format?: No

## 2024-05-07 DIAGNOSIS — G62.9 NEUROPATHY: ICD-10-CM

## 2024-05-07 RX ORDER — GABAPENTIN 300 MG/1
CAPSULE ORAL
Qty: 150 CAPSULE | Refills: 0 | Status: SHIPPED | OUTPATIENT
Start: 2024-05-07

## 2024-05-10 DIAGNOSIS — E03.9 ACQUIRED HYPOTHYROIDISM: Chronic | ICD-10-CM

## 2024-05-10 DIAGNOSIS — E53.8 FOLATE DEFICIENCY: ICD-10-CM

## 2024-05-11 RX ORDER — LEVOTHYROXINE SODIUM 0.03 MG/1
25 TABLET ORAL DAILY
Qty: 90 TABLET | Refills: 1 | Status: SHIPPED | OUTPATIENT
Start: 2024-05-11

## 2024-05-11 RX ORDER — FOLIC ACID 1 MG/1
1 TABLET ORAL DAILY
Qty: 90 TABLET | Refills: 1 | Status: SHIPPED | OUTPATIENT
Start: 2024-05-11

## 2024-06-05 NOTE — PROGRESS NOTES
The patient has a pain history of the following:  Cervical disc disease  Cervical disc displacement  Cervical spondylosis  Neuropathy     Previous interventions that the patient has received include:   Shoulder injections - Dr. Carroll      Pain medications include:  Gabapentin     Previously: Compounded pain cream (no benefit)     Other conservative modalities which the patient reports using include:  Physical Therapy: no  Chiropractor: yes  Neck or back surgery: no  Past pain management: no  Heat - no benefit   Ice - no benefit      Past Significant Surgical History:  None     HPI:     CHIEF COMPLAINT Neck Pain  Neck pain  Patient stated that she has arthritis in her neck that causes burning in her hands and feet.  Subjective   Cailin Garcia is a 63 y.o. female  who presents for follow-up.  She has a history of chronic hand, foot, and groin pain that she described as a burning pain.  At her previous visit she was only experiencing burning pain at night.  We had adjusted her gabapentin to 300 mg 3 times daily and 600 mg at night.  We discussed that we may consider extended release gabapentin in the future if 4 times daily dosing becomes a burden.    History of Present Illness  She continues taking gabapentin 300mg TID and 600mg qhs without side effects and with significant relief of her pain.         PEG Assessment   What number best describes your pain on average in the past week?8 sometimes  What number best describes how, during the past week, pain has interfered with your enjoyment of life?1  What number best describes how, during the past week, pain has interfered with your general activity?  1    REVIEW OF PERTINENT MEDICAL DATA  None    The following portions of the patient's history were reviewed and updated as appropriate: allergies, current medications, past family history, past medical history, past social history, past surgical history, and problem list.    Review of Systems   Constitutional:   "Negative for activity change and fatigue.   Gastrointestinal:  Negative for abdominal pain, constipation and diarrhea.   Genitourinary:  Negative for difficulty urinating and dysuria.   Neurological:  Negative for dizziness, weakness, light-headedness, numbness and headaches.   Psychiatric/Behavioral:  Positive for sleep disturbance. Negative for agitation and suicidal ideas. The patient is not nervous/anxious.      I have reviewed and confirmed the accuracy of the ROS as documented by the MA/LPN/RN Samara Bills MD    Vitals:    06/06/24 1101   BP: 112/70   BP Location: Left arm   Patient Position: Sitting   Cuff Size: Adult   Pulse: 70   Temp: 96.5 °F (35.8 °C)   SpO2: 98%   Weight: 67.4 kg (148 lb 9.6 oz)   Height: 165.1 cm (65\")   PainSc:   2   PainLoc: Neck         Objective   Physical Exam  Constitutional:       General: She is not in acute distress.  Pulmonary:      Effort: Pulmonary effort is normal. No respiratory distress.   Skin:     General: Skin is warm and dry.   Neurological:      Mental Status: She is alert.   Psychiatric:         Mood and Affect: Mood normal.         Thought Content: Thought content normal.             Assessment & Plan   Diagnoses and all orders for this visit:    1. Neuropathy  -     gabapentin (NEURONTIN) 300 MG capsule; Take 1 capsule PO 3 times daily and 2 capsules PO before bed.  Dispense: 450 capsule; Refill: 1          - Will continue gabapentin as previously prescribed.  Will provide 90 day supply to help decrease issues with obtaining her prescription (she travels between [place] and [place] frequently).       --- Follow-up 6 months.           LURDES REPORT    As part of the patient's treatment plan, I am prescribing controlled substances. The patient has been made aware of appropriate use of such medications, including potential risk of somnolence, limited ability to drive and/or work safely, and the potential for dependence or overdose. It has also bee made clear that " these medications are for use by this patient only, without concomitant use of alcohol or other substances unless prescribed.     As the clinician, I personally reviewed the LURDES from 6/6/24 .    History and physical exam exhibit continued safe and appropriate use of controlled substances.       Samara Bills MD  Pain Management    EMR Dragon/Transcription disclaimer:   Much of this encounter note is an electronic transcription/translation of spoken language to printed text. The electronic translation of spoken language may permit erroneous, or at times, nonsensical words or phrases to be inadvertently transcribed; Although I have reviewed the note for such errors, some may still exist.

## 2024-06-06 ENCOUNTER — OFFICE VISIT (OUTPATIENT)
Dept: PAIN MEDICINE | Facility: CLINIC | Age: 64
End: 2024-06-06
Payer: COMMERCIAL

## 2024-06-06 VITALS
HEIGHT: 65 IN | HEART RATE: 70 BPM | BODY MASS INDEX: 24.76 KG/M2 | DIASTOLIC BLOOD PRESSURE: 70 MMHG | TEMPERATURE: 96.5 F | WEIGHT: 148.6 LBS | OXYGEN SATURATION: 98 % | SYSTOLIC BLOOD PRESSURE: 112 MMHG

## 2024-06-06 DIAGNOSIS — G62.9 NEUROPATHY: ICD-10-CM

## 2024-06-06 RX ORDER — CLOTRIMAZOLE AND BETAMETHASONE DIPROPIONATE 10; .64 MG/G; MG/G
CREAM TOPICAL
COMMUNITY
Start: 2024-03-06

## 2024-06-06 RX ORDER — CETIRIZINE HYDROCHLORIDE 10 MG/1
TABLET ORAL
COMMUNITY
Start: 2024-03-14

## 2024-06-06 RX ORDER — GABAPENTIN 300 MG/1
CAPSULE ORAL
Qty: 450 CAPSULE | Refills: 1 | Status: SHIPPED | OUTPATIENT
Start: 2024-06-06

## 2024-06-11 ENCOUNTER — APPOINTMENT (RX ONLY)
Dept: URBAN - METROPOLITAN AREA CLINIC 124 | Facility: CLINIC | Age: 64
Setting detail: DERMATOLOGY
End: 2024-06-11

## 2024-06-11 DIAGNOSIS — L82.1 OTHER SEBORRHEIC KERATOSIS: ICD-10-CM

## 2024-06-11 DIAGNOSIS — L71.8 OTHER ROSACEA: ICD-10-CM

## 2024-06-11 DIAGNOSIS — L81.4 OTHER MELANIN HYPERPIGMENTATION: ICD-10-CM

## 2024-06-11 DIAGNOSIS — D22 MELANOCYTIC NEVI: ICD-10-CM

## 2024-06-11 DIAGNOSIS — D18.0 HEMANGIOMA: ICD-10-CM

## 2024-06-11 DIAGNOSIS — Z71.89 OTHER SPECIFIED COUNSELING: ICD-10-CM

## 2024-06-11 DIAGNOSIS — L57.0 ACTINIC KERATOSIS: ICD-10-CM

## 2024-06-11 PROBLEM — D18.01 HEMANGIOMA OF SKIN AND SUBCUTANEOUS TISSUE: Status: ACTIVE | Noted: 2024-06-11

## 2024-06-11 PROBLEM — D22.5 MELANOCYTIC NEVI OF TRUNK: Status: ACTIVE | Noted: 2024-06-11

## 2024-06-11 PROCEDURE — 99213 OFFICE O/P EST LOW 20 MIN: CPT

## 2024-06-11 PROCEDURE — ? COUNSELING

## 2024-06-11 PROCEDURE — ? PRESCRIPTION

## 2024-06-11 PROCEDURE — ? DEFER

## 2024-06-11 RX ORDER — METRONIDAZOLE 7.5 MG/G
GEL TOPICAL QD
Qty: 45 | Refills: 1 | Status: ERX | COMMUNITY
Start: 2024-06-11

## 2024-06-11 RX ADMIN — METRONIDAZOLE: 7.5 GEL TOPICAL at 00:00

## 2024-06-11 ASSESSMENT — LOCATION DETAILED DESCRIPTION DERM
LOCATION DETAILED: LEFT INFERIOR UPPER BACK
LOCATION DETAILED: LEFT INFERIOR CENTRAL MALAR CHEEK
LOCATION DETAILED: LEFT NASAL ALA
LOCATION DETAILED: EPIGASTRIC SKIN
LOCATION DETAILED: RIGHT INFERIOR LATERAL MIDBACK
LOCATION DETAILED: MIDDLE STERNUM
LOCATION DETAILED: RIGHT CENTRAL MALAR CHEEK

## 2024-06-11 ASSESSMENT — LOCATION SIMPLE DESCRIPTION DERM
LOCATION SIMPLE: LEFT NOSE
LOCATION SIMPLE: LEFT UPPER BACK
LOCATION SIMPLE: RIGHT CHEEK
LOCATION SIMPLE: LEFT CHEEK
LOCATION SIMPLE: ABDOMEN
LOCATION SIMPLE: CHEST
LOCATION SIMPLE: RIGHT LOWER BACK

## 2024-06-11 ASSESSMENT — LOCATION ZONE DERM
LOCATION ZONE: TRUNK
LOCATION ZONE: FACE
LOCATION ZONE: NOSE

## 2024-06-11 NOTE — PROCEDURE: DEFER
X Size Of Lesion In Cm (Optional): 0
Introduction Text (Please End With A Colon): The following procedure was deferred:
Detail Level: Detailed
Reason To Defer Override: pt advised to return before Sept, 2024 to have lesion treated.

## 2024-08-16 NOTE — PROGRESS NOTES
"Subjective   Cailin Garcia is a 58 y.o. female.     History of Present Illness     Chief Complaint:   Chief Complaint   Patient presents with   • Hypothyroidism     med refill - lab results       Cailin Garcia 58 y.o. female who presents today for Medical Management of the below listed issues and medication refills.  she has a problem list of   Patient Active Problem List   Diagnosis   • Post-cholecystectomy syndrome   • Hypothyroidism   .  Since the last visit, she has overall felt well.  she has been compliant with   Current Outpatient Medications:   •  levothyroxine (SYNTHROID) 25 MCG tablet, Take 1 tablet by mouth Daily., Disp: 90 tablet, Rfl: 3.  she denies medication side effects.    All of the chronic condition(s) listed above are stable w/o issues.    /62   Pulse 62   Temp 98.1 °F (36.7 °C) (Oral)   Resp 14   Ht 165.1 cm (65\")   Wt 67.6 kg (149 lb)   BMI 24.79 kg/m²     Results for orders placed or performed in visit on 03/05/19   TSH   Result Value Ref Range    TSH 2.680 0.270 - 4.200 mIU/mL   T4, Free   Result Value Ref Range    Free T4 1.15 0.93 - 1.70 ng/dL           The following portions of the patient's history were reviewed and updated as appropriate: allergies, current medications, past family history, past medical history, past social history, past surgical history and problem list.    Review of Systems   Constitutional: Negative for activity change, chills, fatigue and fever.   Respiratory: Negative for cough and chest tightness.    Cardiovascular: Negative for chest pain and palpitations.   Gastrointestinal: Negative for abdominal pain and nausea.   Endocrine: Negative for cold intolerance.   Psychiatric/Behavioral: Negative for behavioral problems and dysphoric mood.       Objective   Physical Exam   Constitutional: She appears well-developed and well-nourished.   Neck: Neck supple. No thyromegaly present.   Cardiovascular: Normal rate and regular rhythm.   No murmur " Anesthesia Post-op Note    Patient: Ravi Dalal  Procedure(s) Performed: APPENDECTOMY, LAPAROSCOPIC, PEDIATRIC (Abdomen)   Anesthesia type: General    Vitals Value Taken Time   Temp 36 08/16/24 0946   Pulse 142 08/16/24 0945   Resp 15 08/16/24 0945   SpO2 98 % 08/16/24 0946   /93 08/16/24 0943   Vitals shown include unfiled device data.      Patient Location: PACU Phase 1  Post-op Vital Signs:stable  Level of Consciousness: awake  Respiratory Status: spontaneous ventilation  Cardiovascular stable  Hydration: euvolemic  Pain Management: adequately controlled  Handoff: Handoff to receiving nurse was performed and questions were answered  Vomiting: none  Nausea: None  Airway Patency:patent  Comments: Patient did well. No complications. Dental exam unchanged. Report given.    IVF: 300  UO: 0  EBL: 0      There were no known notable events for this encounter.                       heard.  Pulmonary/Chest: Effort normal and breath sounds normal.   Abdominal: Bowel sounds are normal. There is no tenderness.   Neurological: She is alert.   Psychiatric: She has a normal mood and affect. Her behavior is normal.   Nursing note and vitals reviewed.  Labs reviewed with pt today during visit. All questions answered.      Assessment/Plan   Cailin was seen today for hypothyroidism.    Diagnoses and all orders for this visit:    Acquired hypothyroidism  -     levothyroxine (SYNTHROID) 25 MCG tablet; Take 1 tablet by mouth Daily.

## 2024-08-29 DIAGNOSIS — E03.9 ACQUIRED HYPOTHYROIDISM: Chronic | ICD-10-CM

## 2024-10-01 ENCOUNTER — TELEPHONE (OUTPATIENT)
Dept: FAMILY MEDICINE CLINIC | Facility: CLINIC | Age: 64
End: 2024-10-01

## 2024-10-01 DIAGNOSIS — E03.9 ACQUIRED HYPOTHYROIDISM: Primary | ICD-10-CM

## 2024-10-01 DIAGNOSIS — Z00.00 ANNUAL PHYSICAL EXAM: ICD-10-CM

## 2024-10-01 NOTE — TELEPHONE ENCOUNTER
Hub staff attempted to follow warm transfer process and was unsuccessful     Caller: Cailin Garcia    Relationship to patient: Self    Best call back number: [phone]    Patient is needing:   PATIENT NEEDS TO SCHEDULE LABS. PLEASE CALL BACK TO GET HER SCHEDULED.

## 2024-11-13 LAB
ALBUMIN SERPL-MCNC: 4.4 G/DL (ref 3.9–4.9)
ALP SERPL-CCNC: 98 IU/L (ref 44–121)
ALT SERPL-CCNC: 25 IU/L (ref 0–32)
AST SERPL-CCNC: 26 IU/L (ref 0–40)
BASOPHILS # BLD AUTO: 0 X10E3/UL (ref 0–0.2)
BASOPHILS NFR BLD AUTO: 1 %
BILIRUB SERPL-MCNC: 0.5 MG/DL (ref 0–1.2)
BUN SERPL-MCNC: 14 MG/DL (ref 8–27)
BUN/CREAT SERPL: 15 (ref 12–28)
CALCIUM SERPL-MCNC: 9.5 MG/DL (ref 8.7–10.3)
CHLORIDE SERPL-SCNC: 104 MMOL/L (ref 96–106)
CHOLEST SERPL-MCNC: 212 MG/DL (ref 100–199)
CO2 SERPL-SCNC: 25 MMOL/L (ref 20–29)
CREAT SERPL-MCNC: 0.95 MG/DL (ref 0.57–1)
EGFRCR SERPLBLD CKD-EPI 2021: 66 ML/MIN/1.73
EOSINOPHIL # BLD AUTO: 0.3 X10E3/UL (ref 0–0.4)
EOSINOPHIL NFR BLD AUTO: 4 %
ERYTHROCYTE [DISTWIDTH] IN BLOOD BY AUTOMATED COUNT: 12.7 % (ref 11.7–15.4)
GLOBULIN SER CALC-MCNC: 2.2 G/DL (ref 1.5–4.5)
GLUCOSE SERPL-MCNC: 103 MG/DL (ref 70–99)
HCT VFR BLD AUTO: 42.8 % (ref 34–46.6)
HDLC SERPL-MCNC: 56 MG/DL
HGB BLD-MCNC: 14.1 G/DL (ref 11.1–15.9)
IMM GRANULOCYTES # BLD AUTO: 0 X10E3/UL (ref 0–0.1)
IMM GRANULOCYTES NFR BLD AUTO: 0 %
LDLC SERPL CALC-MCNC: 129 MG/DL (ref 0–99)
LYMPHOCYTES # BLD AUTO: 2 X10E3/UL (ref 0.7–3.1)
LYMPHOCYTES NFR BLD AUTO: 30 %
MCH RBC QN AUTO: 30.7 PG (ref 26.6–33)
MCHC RBC AUTO-ENTMCNC: 32.9 G/DL (ref 31.5–35.7)
MCV RBC AUTO: 93 FL (ref 79–97)
MONOCYTES # BLD AUTO: 0.5 X10E3/UL (ref 0.1–0.9)
MONOCYTES NFR BLD AUTO: 7 %
NEUTROPHILS # BLD AUTO: 3.7 X10E3/UL (ref 1.4–7)
NEUTROPHILS NFR BLD AUTO: 58 %
PLATELET # BLD AUTO: 273 X10E3/UL (ref 150–450)
POTASSIUM SERPL-SCNC: 4.2 MMOL/L (ref 3.5–5.2)
PROT SERPL-MCNC: 6.6 G/DL (ref 6–8.5)
RBC # BLD AUTO: 4.59 X10E6/UL (ref 3.77–5.28)
SODIUM SERPL-SCNC: 143 MMOL/L (ref 134–144)
T4 FREE SERPL-MCNC: 1.18 NG/DL (ref 0.82–1.77)
TRIGL SERPL-MCNC: 151 MG/DL (ref 0–149)
TSH SERPL DL<=0.005 MIU/L-ACNC: 2.6 UIU/ML (ref 0.45–4.5)
VLDLC SERPL CALC-MCNC: 27 MG/DL (ref 5–40)
WBC # BLD AUTO: 6.4 X10E3/UL (ref 3.4–10.8)

## 2024-11-13 RX ORDER — LEVOTHYROXINE SODIUM 25 UG/1
25 TABLET ORAL DAILY
Qty: 90 TABLET | Refills: 1 | OUTPATIENT
Start: 2024-11-13

## 2024-11-13 NOTE — PROGRESS NOTES
Subjective   The ABCs of the Annual Wellness Visit  Medicare Wellness Visit      Cailin Garcia is a 64 y.o. patient who presents for a Medicare Wellness Visit.    The following portions of the patient's history were reviewed and   updated as appropriate: allergies, current medications, past family history, past medical history, past social history, past surgical history, and problem list.    Compared to one year ago, the patient's physical   health is the same.  Compared to one year ago, the patient's mental   health is the same.    Recent Hospitalizations:  She was not admitted to the hospital during the last year.     Current Medical Providers:  Patient Care Team:  Dallas King MD as PCP - General (Family Medicine)  Brianne Flannery MD as Obstetrician (Obstetrics and Gynecology)    Outpatient Medications Prior to Visit   Medication Sig Dispense Refill    cetirizine (ZyrTEC Allergy) 10 MG tablet       Cholecalciferol (Vitamin D) 50 MCG (2000 UT) tablet Take 1 tablet by mouth Daily.      clotrimazole-betamethasone (LOTRISONE) 1-0.05 % cream APPLY TO THE AFFECTED AREAS TWICE DAILY      fluocinonide (LIDEX) 0.05 % cream       gabapentin (NEURONTIN) 300 MG capsule Take 1 capsule PO 3 times daily and 2 capsules PO before bed. 450 capsule 1    vitamin B-12 (CYANOCOBALAMIN) 1000 MCG tablet Take 1 tablet by mouth Daily.      folic acid (FOLVITE) 1 MG tablet Take 1 tablet by mouth Daily. 90 tablet 1    levothyroxine (Synthroid) 25 MCG tablet Take 1 tablet by mouth Daily. 90 tablet 1     No facility-administered medications prior to visit.     No opioid medication identified on active medication list. I have reviewed chart for other potential  high risk medication/s and harmful drug interactions in the elderly.      Aspirin is not on active medication list.  Aspirin use is not indicated based on review of current medical condition/s. Risk of harm outweighs potential benefits.  .    Patient Active Problem List  "  Diagnosis    Post-cholecystectomy syndrome    Hypothyroidism    Hair loss    Screen for colon cancer    DDD (degenerative disc disease), cervical    B12 deficiency    RLS (restless legs syndrome)    Folate deficiency    TIA (transient ischemic attack)    Migraine with aura and without status migrainosus, not intractable    Rosacea     Advance Care Planning Advance Directive is not on file.  ACP discussion was held with the patient during this visit. Patient does not have an advance directive, declines further assistance.            Objective   Vitals:    11/14/24 0914   BP: 116/74   Pulse: 70   Resp: 16   Temp: 97.5 °F (36.4 °C)   TempSrc: Oral   SpO2: 98%   Weight: 66 kg (145 lb 9.6 oz)   Height: 160 cm (63\")   PainSc:   2   PainLoc: Comment: burning sensation hands / feet       Estimated body mass index is 25.79 kg/m² as calculated from the following:    Height as of this encounter: 160 cm (63\").    Weight as of this encounter: 66 kg (145 lb 9.6 oz).    BMI is within normal parameters. No other follow-up for BMI required.       Does the patient have evidence of cognitive impairment? No  Lab Results   Component Value Date    CHLPL 212 (H) 11/12/2024    TRIG 151 (H) 11/12/2024    HDL 56 11/12/2024     (H) 11/12/2024    VLDL 27 11/12/2024                                                                                                Health  Risk Assessment    Smoking Status:  Social History     Tobacco Use   Smoking Status Never   Smokeless Tobacco Never     Alcohol Consumption:  Social History     Substance and Sexual Activity   Alcohol Use Yes    Alcohol/week: 4.0 standard drinks of alcohol    Types: 1 Glasses of wine, 1 Shots of liquor, 2 Drinks containing 0.5 oz of alcohol per week    Comment: Not consistent- not drinking much since starting Gabapentin       Fall Risk Screen  STEADI Fall Risk Assessment has not been completed.    Depression Screening   Little interest or pleasure in doing things? Not at " all   Feeling down, depressed, or hopeless? Not at all   PHQ-2 Total Score 0      Health Habits and Functional and Cognitive Screenin/14/2024     9:00 AM   Functional & Cognitive Status   Do you have difficulty preparing food and eating? No   Do you have difficulty bathing yourself, getting dressed or grooming yourself? No   Do you have difficulty using the toilet? No   Do you have difficulty moving around from place to place? No   Current Diet Well Balanced Diet   Dental Exam Up to date   Eye Exam Not up to date   Exercise (times per week) 0 times per week   Current Exercises Include No Regular Exercise   Do you need help using the phone?  No   Are you deaf or do you have serious difficulty hearing?  No   Do you need help to go to places out of walking distance? No   Do you need help shopping? No   Do you need help preparing meals?  No   Do you need help with housework?  No   Do you need help with laundry? No   Do you need help taking your medications? No   Do you need help managing money? No   Do you ever drive or ride in a car without wearing a seat belt? No   Have you felt unusual stress, anger or loneliness in the last month? No   Who do you live with? Spouse   If you need help, do you have trouble finding someone available to you? No   Have you been bothered in the last four weeks by sexual problems? No   Do you have difficulty concentrating, remembering or making decisions? No           Age-appropriate Screening Schedule:  Refer to the list below for future screening recommendations based on patient's age, sex and/or medical conditions. Orders for these recommended tests are listed in the plan section. The patient has been provided with a written plan.    Health Maintenance List  Health Maintenance   Topic Date Due    BMI FOLLOWUP  Never done    Pneumococcal Vaccine 65+ (1 of 1 - PCV) Never done    DXA SCAN  10/17/2024    MAMMOGRAM  2025 (Originally 10/17/2023)    ANNUAL WELLNESS VISIT   "11/14/2025    TDAP/TD VACCINES (3 - Td or Tdap) 09/01/2027    COLORECTAL CANCER SCREENING  12/13/2031    COVID-19 Vaccine  Completed    INFLUENZA VACCINE  Completed    HEPATITIS C SCREENING  Discontinued    PAP SMEAR  Discontinued    ZOSTER VACCINE  Discontinued                                                                                                                                                CMS Preventative Services Quick Reference  Risk Factors Identified During Encounter  None Identified    The above risks/problems have been discussed with the patient.  Pertinent information has been shared with the patient in the After Visit Summary.  An After Visit Summary and PPPS were made available to the patient.    Follow Up:   Next Medicare Wellness visit to be scheduled in 1 year.         Additional E&M Note during same encounter follows:  Patient has additional, significant, and separately identifiable condition(s)/problem(s) that require work above and beyond the Medicare Wellness Visit     Chief Complaint  Hypothyroidism (Med refill /Lab results  BMI DUE ) and medicare wellness (Due  / EKG DONE TODAY PER PT REQUEST)    Subjective   HPI  Cailin is also being seen today for additional medical problem/s.    Review of Systems   Constitutional:  Negative for chills and fever.   HENT:  Negative for congestion and sinus pressure.    Eyes:  Negative for visual disturbance.   Respiratory:  Negative for cough and shortness of breath.    Cardiovascular:  Negative for chest pain.   Gastrointestinal:  Negative for abdominal pain.   Genitourinary:  Negative for dysuria.   Skin:  Negative for rash.   Hematological:  Negative for adenopathy.   Psychiatric/Behavioral:  Negative for dysphoric mood.               Objective   Vital Signs:  /74   Pulse 70   Temp 97.5 °F (36.4 °C) (Oral)   Resp 16   Ht 160 cm (63\")   Wt 66 kg (145 lb 9.6 oz)   SpO2 98%   BMI 25.79 kg/m²   Physical Exam  Vitals and nursing note " reviewed.   Constitutional:       General: She is not in acute distress.     Appearance: She is well-developed.   Cardiovascular:      Rate and Rhythm: Normal rate and regular rhythm.   Pulmonary:      Effort: Pulmonary effort is normal.      Breath sounds: Normal breath sounds.   Neurological:      Mental Status: She is alert and oriented to person, place, and time.   Psychiatric:         Behavior: Behavior normal.         Thought Content: Thought content normal.     Labs reviewed with pt today during visit. All questions answered.      The following data was reviewed by: Dallas King MD on 11/14/2024:              Assessment and Plan Additional age appropriate preventative wellness advice topics were discussed during today's preventative wellness exam(some topics already addressed during AWV portion of the note above):    Physical Activity: Advised cardiovascular activity 150 minutes per week as tolerated. (example brisk walk for 30 minutes, 5 days a week).     Nutrition: Discussed nutrition plan with patient. Information shared in after visit summary. Goal is for a well balanced diet to enhance overall health.           Welcome to Medicare preventive visit    Orders:    ECG 12 Lead    Folate deficiency    Orders:    folic acid (FOLVITE) 1 MG tablet; Take 1 tablet by mouth Daily.    Acquired hypothyroidism    Orders:    levothyroxine (Synthroid) 25 MCG tablet; Take 1 tablet by mouth Daily.          I spent 15 minutes caring for Cailin on this date of service. This time includes time spent by me in the following activities:preparing for the visit, reviewing tests, performing a medically appropriate examination and/or evaluation , ordering medications, tests, or procedures, documenting information in the medical record, and independently interpreting results and communicating that information with the patient/family/caregiver  Follow Up   Return in about 1 year (around 11/14/2025) for Annual physical,  Recheck.  Patient was given instructions and counseling regarding her condition or for health maintenance advice. Please see specific information pulled into the AVS if appropriate.

## 2024-11-14 ENCOUNTER — OFFICE VISIT (OUTPATIENT)
Dept: FAMILY MEDICINE CLINIC | Facility: CLINIC | Age: 64
End: 2024-11-14
Payer: MEDICARE

## 2024-11-14 VITALS
HEART RATE: 70 BPM | OXYGEN SATURATION: 98 % | HEIGHT: 63 IN | DIASTOLIC BLOOD PRESSURE: 74 MMHG | WEIGHT: 145.6 LBS | BODY MASS INDEX: 25.8 KG/M2 | RESPIRATION RATE: 16 BRPM | TEMPERATURE: 97.5 F | SYSTOLIC BLOOD PRESSURE: 116 MMHG

## 2024-11-14 DIAGNOSIS — E53.8 FOLATE DEFICIENCY: Chronic | ICD-10-CM

## 2024-11-14 DIAGNOSIS — Z00.00 WELCOME TO MEDICARE PREVENTIVE VISIT: Primary | ICD-10-CM

## 2024-11-14 DIAGNOSIS — E03.9 ACQUIRED HYPOTHYROIDISM: Chronic | ICD-10-CM

## 2024-11-14 RX ORDER — LEVOTHYROXINE SODIUM 25 UG/1
25 TABLET ORAL DAILY
Qty: 90 TABLET | Refills: 3 | Status: SHIPPED | OUTPATIENT
Start: 2024-11-14

## 2024-11-14 RX ORDER — FOLIC ACID 1 MG/1
1 TABLET ORAL DAILY
Qty: 90 TABLET | Refills: 3 | Status: SHIPPED | OUTPATIENT
Start: 2024-11-14

## 2024-11-14 NOTE — PATIENT INSTRUCTIONS
Medicare Wellness  Personal Prevention Plan of Service     Date of Office Visit:    Encounter Provider:  Dallas King MD  Place of Service:  [facility]  Patient Name: Cailin Garcia  :  1960    As part of the Medicare Wellness portion of your visit today, we are providing you with this personalized preventive plan of services (PPPS). This plan is based upon recommendations of the [place] Preventive Services Task Force (USPSTF) and the Advisory Committee on Immunization Practices (ACIP).    This lists the preventive care services that should be considered, and provides dates of when you are due. Items listed as completed are up-to-date and do not require any further intervention.    Health Maintenance   Topic Date Due    BMI FOLLOWUP  Never done    Pneumococcal Vaccine 65+ (1 of 1 - PCV) Never done    DXA SCAN  10/17/2024    MAMMOGRAM  2025 (Originally 10/17/2023)    ANNUAL WELLNESS VISIT  2025    TDAP/TD VACCINES (3 - Td or Tdap) 2027    COLORECTAL CANCER SCREENING  2031    COVID-19 Vaccine  Completed    INFLUENZA VACCINE  Completed    HEPATITIS C SCREENING  Discontinued    PAP SMEAR  Discontinued    ZOSTER VACCINE  Discontinued       Orders Placed This Encounter   Procedures    ECG 12 Lead     This order was created via procedure documentation     Order Specific Question:   Release to patient     Answer:   Routine Release [[id number]]       Return in about 1 year (around 2025) for Annual physical, Recheck.

## 2024-11-14 NOTE — PROGRESS NOTES
Procedure     ECG 12 Lead    Date/Time: 11/14/2024 9:46 AM  Performed by: Dallas King MD    Authorized by: Dallas King MD  Comparison: compared with previous ECG from 11/23/2023  Similar to previous ECG  Rhythm: sinus rhythm  Rate: normal  Conduction: conduction normal  ST Segments: ST segments normal  QRS axis: normal  Other: no other findings    Clinical impression: normal ECG

## 2024-11-19 ENCOUNTER — APPOINTMENT (OUTPATIENT)
Dept: WOMENS IMAGING | Facility: HOSPITAL | Age: 64
End: 2024-11-19
Payer: MEDICARE

## 2024-11-19 PROCEDURE — 77067 SCR MAMMO BI INCL CAD: CPT | Performed by: RADIOLOGY

## 2024-11-19 PROCEDURE — 77063 BREAST TOMOSYNTHESIS BI: CPT | Performed by: RADIOLOGY

## 2024-12-04 NOTE — PROGRESS NOTES
The patient has a pain history of the following:  Cervical disc disease  Cervical disc displacement  Cervical spondylosis  Neuropathy     Previous interventions that the patient has received include:   Shoulder injections - Dr. Carroll      Pain medications include:  Gabapentin     Previously: Compounded pain cream (no benefit)     Other conservative modalities which the patient reports using include:  Physical Therapy: no  Chiropractor: yes  Neck or back surgery: no  Past pain management: no  Heat - no benefit   Ice - no benefit      Past Significant Surgical History:  None     HPI:     CHIEF COMPLAINT Neuropathy      Subjective   Cailin Garcia is a 64 y.o. female  who presents for follow-up.  She has a history of chronic, burning hand foot and groin pain that occurs at night.  She manages this pain by taking gabapentin 300 mg 3 times daily and 600 mg at night.    History of Present Illness  She states that she has not been sleeping at night because of the burning in her hands and feet.  She continues to take gabapentin 3 times daily (300mg) and 600mg nightly, but she is waking before morning with pain.  She also has a diagnosis of restless leg syndrome.        PEG Assessment   What number best describes your pain on average in the past week?5  What number best describes how, during the past week, pain has interfered with your enjoyment of life?4  What number best describes how, during the past week, pain has interfered with your general activity?  2    REVIEW OF PERTINENT MEDICAL DATA  No new     The following portions of the patient's history were reviewed and updated as appropriate: allergies, current medications, past family history, past medical history, past social history, past surgical history, and problem list.    Review of Systems   Gastrointestinal:  Negative for constipation and diarrhea.   Genitourinary:  Negative for difficulty urinating.   Neurological:  Positive for weakness (bilateral hands).  "Negative for numbness.   Psychiatric/Behavioral:  Positive for sleep disturbance. Negative for suicidal ideas. The patient is not nervous/anxious.      I have reviewed and confirmed the accuracy of the ROS as documented by the MA/LPN/RN Samara Bills MD    Vitals:    12/05/24 0921   BP: 134/81   Pulse: 69   Resp: 18   Temp: 98.2 °F (36.8 °C)   SpO2: 99%   Weight: 67.6 kg (149 lb)   Height: 160 cm (63\")   PainSc: 2  Comment: hand pain 2/10   PainLoc: Foot         Objective   Physical Exam  Constitutional:       General: She is not in acute distress.  Pulmonary:      Effort: Pulmonary effort is normal. No respiratory distress.   Skin:     General: Skin is warm and dry.   Neurological:      Mental Status: She is alert.   Psychiatric:         Mood and Affect: Mood normal.         Thought Content: Thought content normal.             Assessment & Plan   Diagnoses and all orders for this visit:    1. RLS (restless legs syndrome) (Primary)  -     Gabapentin Enacarbil ER ([facility]) 600 MG tablet controlled-release; Take 600 mg by mouth Every Night.  Dispense: 30 tablet; Refill: 5    2. Neuropathy  -     Gabapentin Enacarbil ER ([facility]) 600 MG tablet controlled-release; Take 600 mg by mouth Every Night.  Dispense: 30 tablet; Refill: 5    3. Chronic pain syndrome  -     Gabapentin Enacarbil ER ([facility]) 600 MG tablet controlled-release; Take 600 mg by mouth Every Night.  Dispense: 30 tablet; Refill: 5          - Will change to [facility] 600mg.  Discussed medication with the patient.  Included in this discussion was the potential for side effects and adverse events.  Patient verbalized understanding and wished to proceed.  Prescription will be sent to pharmacy.   - If this medication is not affordable, we'll try Lyrica.       --- Follow-up 1-2 months.            LURDES REPORT    As part of the patient's treatment plan, I am prescribing controlled substances. The patient has been made aware of appropriate use of such " medications, including potential risk of somnolence, limited ability to drive and/or work safely, and the potential for dependence or overdose. It has also bee made clear that these medications are for use by this patient only, without concomitant use of alcohol or other substances unless prescribed.     As the clinician, I personally reviewed the LURDES from 12/5/24 .    History and physical exam exhibit continued safe and appropriate use of controlled substances.       Samara Bills MD  Pain Management    EMR Dragon/Transcription disclaimer:   Much of this encounter note is an electronic transcription/translation of spoken language to printed text. The electronic translation of spoken language may permit erroneous, or at times, nonsensical words or phrases to be inadvertently transcribed; Although I have reviewed the note for such errors, some may still exist.

## 2024-12-05 ENCOUNTER — OFFICE VISIT (OUTPATIENT)
Dept: PAIN MEDICINE | Facility: CLINIC | Age: 64
End: 2024-12-05
Payer: MEDICARE

## 2024-12-05 VITALS
WEIGHT: 149 LBS | RESPIRATION RATE: 18 BRPM | HEIGHT: 63 IN | SYSTOLIC BLOOD PRESSURE: 134 MMHG | TEMPERATURE: 98.2 F | HEART RATE: 69 BPM | BODY MASS INDEX: 26.4 KG/M2 | OXYGEN SATURATION: 99 % | DIASTOLIC BLOOD PRESSURE: 81 MMHG

## 2024-12-05 DIAGNOSIS — G62.9 NEUROPATHY: ICD-10-CM

## 2024-12-05 DIAGNOSIS — G25.81 RLS (RESTLESS LEGS SYNDROME): Primary | ICD-10-CM

## 2024-12-05 DIAGNOSIS — G89.4 CHRONIC PAIN SYNDROME: ICD-10-CM

## 2024-12-05 PROCEDURE — 1159F MED LIST DOCD IN RCRD: CPT | Performed by: ANESTHESIOLOGY

## 2024-12-05 PROCEDURE — 99214 OFFICE O/P EST MOD 30 MIN: CPT | Performed by: ANESTHESIOLOGY

## 2024-12-05 PROCEDURE — 1160F RVW MEDS BY RX/DR IN RCRD: CPT | Performed by: ANESTHESIOLOGY

## 2024-12-05 PROCEDURE — 1125F AMNT PAIN NOTED PAIN PRSNT: CPT | Performed by: ANESTHESIOLOGY

## 2024-12-05 RX ORDER — GABAPENTIN ENACARBIL 600 MG/1
600 TABLET, EXTENDED RELEASE ORAL NIGHTLY
Qty: 30 TABLET | Refills: 5 | Status: SHIPPED | OUTPATIENT
Start: 2024-12-05 | End: 2024-12-06 | Stop reason: ALTCHOICE

## 2024-12-06 ENCOUNTER — TELEPHONE (OUTPATIENT)
Dept: PAIN MEDICINE | Facility: CLINIC | Age: 64
End: 2024-12-06
Payer: MEDICARE

## 2024-12-06 DIAGNOSIS — G62.9 NEUROPATHY: Primary | ICD-10-CM

## 2024-12-06 DIAGNOSIS — G25.81 RLS (RESTLESS LEGS SYNDROME): ICD-10-CM

## 2024-12-06 DIAGNOSIS — G89.4 CHRONIC PAIN SYNDROME: ICD-10-CM

## 2024-12-06 RX ORDER — PREGABALIN 75 MG/1
CAPSULE ORAL
Qty: 90 CAPSULE | Refills: 5 | Status: SHIPPED | OUTPATIENT
Start: 2024-12-06

## 2024-12-06 NOTE — TELEPHONE ENCOUNTER
Attempted a PA fpr the [facility] and it is not covered on the patients prescription plan.- covered formularies are: gabapentin and pregabalin with the exclusion of extended release pregabalin. Patient has been informed. How would you like to move forward.

## 2024-12-06 NOTE — TELEPHONE ENCOUNTER
Even with her diagnosis of RLS?     I've sent a prescription for Lyrica 75mg Twice daily to [facility].  May increase to TID if needed.

## 2024-12-19 ENCOUNTER — PATIENT MESSAGE (OUTPATIENT)
Dept: PAIN MEDICINE | Facility: CLINIC | Age: 64
End: 2024-12-19
Payer: MEDICARE

## 2024-12-19 DIAGNOSIS — G89.4 CHRONIC PAIN SYNDROME: ICD-10-CM

## 2024-12-19 DIAGNOSIS — G62.9 NEUROPATHY: Primary | ICD-10-CM

## 2024-12-19 DIAGNOSIS — G25.81 RLS (RESTLESS LEGS SYNDROME): ICD-10-CM

## 2024-12-19 RX ORDER — GABAPENTIN 300 MG/1
CAPSULE ORAL
Qty: 450 CAPSULE | Refills: 0 | Status: SHIPPED | OUTPATIENT
Start: 2024-12-19

## 2024-12-19 NOTE — TELEPHONE ENCOUNTER
Spoke with Desitnee Jose.  We will return to her Gabapentin 300mg TID and 600mg nightly.  Prescription sent.     When I see her back in February, we may consider Lyrica 100mg TID or 150mg BID with option to increase to TID.

## 2025-02-11 NOTE — PROGRESS NOTES
The patient has a pain history of the following:  Cervical disc disease  Cervical disc displacement  Cervical spondylosis  Neuropathy     Previous interventions that the patient has received include:   Shoulder injections - Dr. Carroll      Pain medications include:  Gabapentin     Previously: Compounded pain cream (no benefit)     Other conservative modalities which the patient reports using include:  Physical Therapy: no  Chiropractor: yes  Neck or back surgery: no  Past pain management: no  Heat - no benefit   Ice - no benefit      Past Significant Surgical History:  None     HPI:     CHIEF COMPLAINT Neuralgia  F/U neuropathy- patient states that her pain has remained the same since her last visit.     Subjective   Cailin Garcia is a 64 y.o. female  who presents for follow-up.  She has a history of chronic burning hand foot and groin pain that occurs worse at night.  She is managing this pain by taking gabapentin 300 mg 3 times daily and 600 mg nightly.  At her previous visit we tried to transition to an extended release gabapentin, however this was not approved by her insurance.  We then tried Lyrica 75 mg up to 3 times daily dosing, and she was not receiving pain relief with this medicine so we switched back to gabapentin.  I discussed with her that we could try a higher dose of Lyrica in the future if the gabapentin was still not controlling her pain.    History of Present Illness  Mrs. Garcia states that her pain has been well-controlled with her regimen of 5 gabapentin per day.  She states that she has noticed she has more symptoms of burning whenever she partakes in any alcoholic beverage.         PEG Assessment   What number best describes your pain on average in the past week?4  What number best describes how, during the past week, pain has interfered with your enjoyment of life?2  What number best describes how, during the past week, pain has interfered with your general activity?  0    REVIEW OF  "PERTINENT MEDICAL DATA  No new     The following portions of the patient's history were reviewed and updated as appropriate: allergies, current medications, past family history, past medical history, past social history, past surgical history, and problem list.    Review of Systems   Constitutional:  Negative for activity change (increased), chills, fatigue and fever.   HENT:  Negative for congestion.    Eyes:  Negative for visual disturbance.   Respiratory:  Negative for chest tightness and shortness of breath.    Cardiovascular:  Negative for chest pain.   Gastrointestinal:  Negative for abdominal pain, constipation and diarrhea.   Genitourinary:  Positive for dysuria. Negative for difficulty urinating and dyspareunia.   Musculoskeletal:  Negative for back pain.   Neurological:  Negative for dizziness, weakness, light-headedness, numbness and headaches.   Psychiatric/Behavioral:  Positive for sleep disturbance. Negative for agitation, self-injury and suicidal ideas. The patient is not nervous/anxious.      I have reviewed and confirmed the accuracy of the ROS as documented by the MA/LPN/RN Samara Bills MD    Vitals:    02/13/25 1122   BP: 121/70   Pulse: 68   Temp: 97.2 °F (36.2 °C)   SpO2: 97%   Weight: 65.6 kg (144 lb 9.6 oz)   Height: 160 cm (63\")   PainSc: 0-No pain         Objective   Physical Exam  Constitutional:       General: She is not in acute distress.  Pulmonary:      Effort: Pulmonary effort is normal. No respiratory distress.   Skin:     General: Skin is warm and dry.   Neurological:      Mental Status: She is alert.   Psychiatric:         Mood and Affect: Mood normal.         Thought Content: Thought content normal.             Assessment & Plan   Diagnoses and all orders for this visit:    1. Neuropathy (Primary)    2. RLS (restless legs syndrome)    3. Chronic pain syndrome          - Continue current treatment plan - chiropractic care, gabapentin.    - She does not currently need a refill of " gabapentin.  I will send it to [facility] on file when she requests it.  Her last refill was transferred to [place], so it is not appearing on LURDES.   - Cailin Garcia  reports that she has never smoked. She has never used smokeless tobacco.     --- Follow-up 6 months            LURDES REPORT    As part of the patient's treatment plan, I am prescribing controlled substances. The patient has been made aware of appropriate use of such medications, including potential risk of somnolence, limited ability to drive and/or work safely, and the potential for dependence or overdose. It has also bee made clear that these medications are for use by this patient only, without concomitant use of alcohol or other substances unless prescribed.       As the clinician, I personally reviewed the LURDES from 2/13/25 .    History and physical exam exhibit continued safe and appropriate use of controlled substances.       Samara Bills MD  Pain Management    EMR Dragon/Transcription disclaimer:   Much of this encounter note is an electronic transcription/translation of spoken language to printed text. The electronic translation of spoken language may permit erroneous, or at times, nonsensical words or phrases to be inadvertently transcribed; Although I have reviewed the note for such errors, some may still exist.

## 2025-02-13 ENCOUNTER — OFFICE VISIT (OUTPATIENT)
Dept: PAIN MEDICINE | Facility: CLINIC | Age: 65
End: 2025-02-13
Payer: MEDICARE

## 2025-02-13 VITALS
SYSTOLIC BLOOD PRESSURE: 121 MMHG | TEMPERATURE: 97.2 F | OXYGEN SATURATION: 97 % | DIASTOLIC BLOOD PRESSURE: 70 MMHG | HEART RATE: 68 BPM | HEIGHT: 63 IN | BODY MASS INDEX: 25.62 KG/M2 | WEIGHT: 144.6 LBS

## 2025-02-13 DIAGNOSIS — G25.81 RLS (RESTLESS LEGS SYNDROME): ICD-10-CM

## 2025-02-13 DIAGNOSIS — G62.9 NEUROPATHY: Primary | ICD-10-CM

## 2025-02-13 DIAGNOSIS — G89.4 CHRONIC PAIN SYNDROME: ICD-10-CM

## 2025-02-13 PROCEDURE — 1126F AMNT PAIN NOTED NONE PRSNT: CPT | Performed by: ANESTHESIOLOGY

## 2025-02-13 PROCEDURE — 1160F RVW MEDS BY RX/DR IN RCRD: CPT | Performed by: ANESTHESIOLOGY

## 2025-02-13 PROCEDURE — 1159F MED LIST DOCD IN RCRD: CPT | Performed by: ANESTHESIOLOGY

## 2025-02-13 PROCEDURE — 99213 OFFICE O/P EST LOW 20 MIN: CPT | Performed by: ANESTHESIOLOGY

## 2025-02-13 RX ORDER — TRIAMCINOLONE ACETONIDE 1 MG/G
1 CREAM TOPICAL 2 TIMES DAILY
COMMUNITY

## 2025-02-17 DIAGNOSIS — G62.9 NEUROPATHY: ICD-10-CM

## 2025-02-17 DIAGNOSIS — G25.81 RLS (RESTLESS LEGS SYNDROME): ICD-10-CM

## 2025-02-17 DIAGNOSIS — G89.4 CHRONIC PAIN SYNDROME: ICD-10-CM

## 2025-02-18 RX ORDER — GABAPENTIN 300 MG/1
CAPSULE ORAL
Qty: 450 CAPSULE | Refills: 1 | Status: SHIPPED | OUTPATIENT
Start: 2025-02-18

## 2025-08-18 RX ORDER — MEFLOQUINE HYDROCHLORIDE 250 MG/1
TABLET ORAL
Qty: 12 TABLET | Refills: 0 | Status: SHIPPED | OUTPATIENT
Start: 2025-08-18

## (undated) DEVICE — LN SMPL CO2 SHTRM SD STREAM W/M LUER

## (undated) DEVICE — KT ORCA ORCAPOD DISP STRL

## (undated) DEVICE — CANN O2 ETCO2 FITS ALL CONN CO2 SMPL A/ 7IN DISP LF

## (undated) DEVICE — SENSR O2 OXIMAX FNGR A/ 18IN NONSTR

## (undated) DEVICE — TUBING, SUCTION, 1/4" X 10', STRAIGHT: Brand: MEDLINE

## (undated) DEVICE — ADAPT CLN BIOGUARD AIR/H2O DISP